# Patient Record
Sex: FEMALE | Race: WHITE | Employment: FULL TIME | URBAN - METROPOLITAN AREA
[De-identification: names, ages, dates, MRNs, and addresses within clinical notes are randomized per-mention and may not be internally consistent; named-entity substitution may affect disease eponyms.]

---

## 2017-01-16 ENCOUNTER — OFFICE VISIT (OUTPATIENT)
Dept: FAMILY MEDICINE CLINIC | Age: 37
End: 2017-01-16

## 2017-01-16 VITALS
SYSTOLIC BLOOD PRESSURE: 112 MMHG | DIASTOLIC BLOOD PRESSURE: 68 MMHG | HEIGHT: 72 IN | BODY MASS INDEX: 35.03 KG/M2 | WEIGHT: 258.6 LBS

## 2017-01-16 DIAGNOSIS — E03.4 HYPOTHYROIDISM DUE TO ACQUIRED ATROPHY OF THYROID: ICD-10-CM

## 2017-01-16 DIAGNOSIS — R25.1 TREMOR: Primary | ICD-10-CM

## 2017-01-16 DIAGNOSIS — F32.9 REACTIVE DEPRESSION: ICD-10-CM

## 2017-01-16 PROCEDURE — 99213 OFFICE O/P EST LOW 20 MIN: CPT | Performed by: FAMILY MEDICINE

## 2017-01-16 RX ORDER — PAROXETINE 10 MG/1
10 TABLET, FILM COATED ORAL 3 TIMES DAILY
COMMUNITY
End: 2017-03-07 | Stop reason: DRUGHIGH

## 2017-01-16 ASSESSMENT — ENCOUNTER SYMPTOMS
RESPIRATORY NEGATIVE: 1
GASTROINTESTINAL NEGATIVE: 1

## 2017-02-07 RX ORDER — PAROXETINE 30 MG/1
30 TABLET, FILM COATED ORAL EVERY MORNING
Qty: 30 TABLET | Refills: 3 | Status: SHIPPED | OUTPATIENT
Start: 2017-02-07 | End: 2017-10-16 | Stop reason: SDUPTHER

## 2017-03-07 ENCOUNTER — OFFICE VISIT (OUTPATIENT)
Dept: FAMILY MEDICINE CLINIC | Age: 37
End: 2017-03-07

## 2017-03-07 VITALS
BODY MASS INDEX: 35.08 KG/M2 | HEIGHT: 72 IN | WEIGHT: 259 LBS | SYSTOLIC BLOOD PRESSURE: 112 MMHG | DIASTOLIC BLOOD PRESSURE: 62 MMHG

## 2017-03-07 DIAGNOSIS — E03.4 HYPOTHYROIDISM DUE TO ACQUIRED ATROPHY OF THYROID: Primary | ICD-10-CM

## 2017-03-07 LAB — TSH SERPL DL<=0.05 MIU/L-ACNC: 4.64 UIU/ML (ref 0.27–4.2)

## 2017-03-07 PROCEDURE — 36415 COLL VENOUS BLD VENIPUNCTURE: CPT | Performed by: FAMILY MEDICINE

## 2017-03-07 PROCEDURE — 99213 OFFICE O/P EST LOW 20 MIN: CPT | Performed by: FAMILY MEDICINE

## 2017-03-07 ASSESSMENT — ENCOUNTER SYMPTOMS
GASTROINTESTINAL NEGATIVE: 1
RESPIRATORY NEGATIVE: 1

## 2017-03-08 RX ORDER — LEVOTHYROXINE SODIUM 175 UG/1
175 TABLET ORAL DAILY
Qty: 30 TABLET | Refills: 3 | Status: SHIPPED | OUTPATIENT
Start: 2017-03-08 | End: 2017-07-06 | Stop reason: SDUPTHER

## 2017-07-07 RX ORDER — LEVOTHYROXINE SODIUM 175 UG/1
TABLET ORAL
Qty: 90 TABLET | Refills: 0 | Status: SHIPPED | OUTPATIENT
Start: 2017-07-07 | End: 2018-01-03 | Stop reason: DRUGHIGH

## 2017-07-07 RX ORDER — SPIRONOLACTONE 50 MG/1
TABLET, FILM COATED ORAL
Qty: 90 TABLET | Refills: 0 | Status: SHIPPED | OUTPATIENT
Start: 2017-07-07 | End: 2017-12-28 | Stop reason: SDUPTHER

## 2017-10-16 RX ORDER — PAROXETINE 30 MG/1
TABLET, FILM COATED ORAL
Qty: 30 TABLET | Refills: 3 | Status: SHIPPED | OUTPATIENT
Start: 2017-10-16 | End: 2018-04-12 | Stop reason: SDUPTHER

## 2017-12-29 RX ORDER — SPIRONOLACTONE 50 MG/1
TABLET, FILM COATED ORAL
Qty: 90 TABLET | Refills: 0 | Status: SHIPPED | OUTPATIENT
Start: 2017-12-29 | End: 2018-04-12 | Stop reason: SDUPTHER

## 2018-01-02 ENCOUNTER — TELEPHONE (OUTPATIENT)
Dept: FAMILY MEDICINE CLINIC | Age: 38
End: 2018-01-02

## 2018-01-02 ENCOUNTER — OFFICE VISIT (OUTPATIENT)
Dept: FAMILY MEDICINE CLINIC | Age: 38
End: 2018-01-02

## 2018-01-02 VITALS
HEIGHT: 72 IN | SYSTOLIC BLOOD PRESSURE: 102 MMHG | TEMPERATURE: 97.5 F | BODY MASS INDEX: 34 KG/M2 | WEIGHT: 251 LBS | DIASTOLIC BLOOD PRESSURE: 68 MMHG

## 2018-01-02 DIAGNOSIS — H66.93 OM (OTITIS MEDIA), RECURRENT, BILATERAL: Primary | ICD-10-CM

## 2018-01-02 DIAGNOSIS — E03.4 HYPOTHYROIDISM DUE TO ACQUIRED ATROPHY OF THYROID: ICD-10-CM

## 2018-01-02 DIAGNOSIS — F32.9 REACTIVE DEPRESSION: ICD-10-CM

## 2018-01-02 LAB — TSH SERPL DL<=0.05 MIU/L-ACNC: 5.48 UIU/ML (ref 0.27–4.2)

## 2018-01-02 PROCEDURE — 99214 OFFICE O/P EST MOD 30 MIN: CPT | Performed by: FAMILY MEDICINE

## 2018-01-02 PROCEDURE — 36415 COLL VENOUS BLD VENIPUNCTURE: CPT | Performed by: FAMILY MEDICINE

## 2018-01-02 RX ORDER — CEPHALEXIN 500 MG/1
500 CAPSULE ORAL 3 TIMES DAILY
Qty: 30 CAPSULE | Refills: 0 | Status: SHIPPED | OUTPATIENT
Start: 2018-01-02 | End: 2018-02-02

## 2018-01-02 ASSESSMENT — ENCOUNTER SYMPTOMS
RHINORRHEA: 1
DIARRHEA: 0
ABDOMINAL PAIN: 0
COUGH: 0
VOMITING: 0
SORE THROAT: 1

## 2018-01-02 NOTE — PROGRESS NOTES
Constitutional: Positive for appetite change, fatigue and unexpected weight change. HENT: Positive for ear pain, hearing loss, rhinorrhea and sore throat. Negative for ear discharge. Respiratory: Negative for cough. Gastrointestinal: Negative for abdominal pain, diarrhea and vomiting. Musculoskeletal: Negative for neck pain. Skin: Negative for rash. Neurological: Positive for headaches. Psychiatric/Behavioral: Positive for agitation. Negative for dysphoric mood and hallucinations. YOB: 1980    Date of Visit:  1/2/2018    No Known Allergies    Outpatient Prescriptions Marked as Taking for the 1/2/18 encounter (Office Visit) with Neil Reynolds,    Medication Sig Dispense Refill    BuPROPion HCl (WELLBUTRIN PO) Take by mouth      spironolactone (ALDACTONE) 50 MG tablet TAKE ONE TABLET BY MOUTH DAILY 90 tablet 0    PARoxetine (PAXIL) 30 MG tablet TAKE ONE TABLET BY MOUTH IN THE MORNING 30 tablet 3    levothyroxine (SYNTHROID) 175 MCG tablet TAKE ONE TABLET BY MOUTH ONCE DAILY 90 tablet 0    vitamin D (CHOLECALCIFEROL) 1000 UNIT TABS tablet Take 1 tablet by mouth daily 90 tablet 3    levothyroxine (LEVOTHROID) 150 MCG tablet Take 1 tablet by mouth daily 30 tablet 3    ATRALIN 0.05 % gel Apply a pea sized amount to face QHS 45 g 4    fluticasone (FLONASE) 50 MCG/ACT nasal spray PLACE ONE SPRAY TWICE A DAY IN EACH NOSTRIL 16 g 2       Vitals:    01/02/18 1434   BP: 102/68   Temp: 97.5 °F (36.4 °C)   Weight: 251 lb (113.9 kg)   Height: 6' (1.829 m)     Body mass index is 34.04 kg/m². Wt Readings from Last 3 Encounters:   01/02/18 251 lb (113.9 kg)   03/07/17 259 lb (117.5 kg)   01/16/17 258 lb 9.6 oz (117.3 kg)     BP Readings from Last 3 Encounters:   01/02/18 102/68   03/07/17 112/62   01/16/17 112/68       Objective:   Physical Exam   Constitutional: She is oriented to person, place, and time. She appears well-developed and well-nourished. No distress.    HENT:   Head: Normocephalic. Right Ear: Tympanic membrane, external ear and ear canal normal.   Left Ear: Tympanic membrane, external ear and ear canal normal.   Nose: Mucosal edema present. Mouth/Throat: Posterior oropharyngeal erythema present. bilat tms red and bulging right > left   Eyes: Conjunctivae are normal. Left eye discharge: pnd    Neck: No thyromegaly present. Cardiovascular: Normal rate. Pulmonary/Chest: Effort normal and breath sounds normal. No respiratory distress. She has no wheezes. She has no rales. Lymphadenopathy:     She has cervical adenopathy. Neurological: She is alert and oriented to person, place, and time. Skin: Skin is warm and dry. No rash noted. Psychiatric: She has a normal mood and affect. Her behavior is normal. Judgment and thought content normal.       Assessment:      Assessment/plan;  Lynette Haque was seen today for congestion, ear fullness, cough and nasal congestion. Diagnoses and all orders for this visit:    OM (otitis media), recurrent, bilateral  -     cephALEXin (KEFLEX) 500 MG capsule; Take 1 capsule by mouth 3 times daily    Hypothyroidism due to acquired atrophy of thyroid  -     TSH without Reflex  Adjust meds as needed    Reactive depression  Saw psychiatrist through her gyn  She added wellbutrin to the paxil and it is helping      No Follow-up on file.

## 2018-01-03 RX ORDER — LEVOTHYROXINE SODIUM 0.2 MG/1
200 TABLET ORAL DAILY
Qty: 30 TABLET | Refills: 3 | Status: SHIPPED | OUTPATIENT
Start: 2018-01-03 | End: 2018-08-25 | Stop reason: SDUPTHER

## 2018-01-09 RX ORDER — AZITHROMYCIN 250 MG/1
TABLET, FILM COATED ORAL
Qty: 1 PACKET | Refills: 0 | Status: SHIPPED | OUTPATIENT
Start: 2018-01-09 | End: 2018-01-19

## 2018-01-12 ENCOUNTER — OFFICE VISIT (OUTPATIENT)
Dept: FAMILY MEDICINE CLINIC | Age: 38
End: 2018-01-12

## 2018-01-12 VITALS
BODY MASS INDEX: 33.86 KG/M2 | WEIGHT: 250 LBS | TEMPERATURE: 98.4 F | DIASTOLIC BLOOD PRESSURE: 70 MMHG | HEART RATE: 61 BPM | SYSTOLIC BLOOD PRESSURE: 104 MMHG | HEIGHT: 72 IN

## 2018-01-12 DIAGNOSIS — H65.03 BILATERAL ACUTE SEROUS OTITIS MEDIA, RECURRENCE NOT SPECIFIED: Primary | ICD-10-CM

## 2018-01-12 DIAGNOSIS — H69.83 DYSFUNCTION OF BOTH EUSTACHIAN TUBES: ICD-10-CM

## 2018-01-12 PROCEDURE — 99213 OFFICE O/P EST LOW 20 MIN: CPT | Performed by: FAMILY MEDICINE

## 2018-01-12 RX ORDER — METHYLPREDNISOLONE 4 MG/1
TABLET ORAL
Qty: 1 KIT | Refills: 0 | Status: SHIPPED | OUTPATIENT
Start: 2018-01-12 | End: 2018-01-18

## 2018-01-12 RX ORDER — DOXYCYCLINE HYCLATE 100 MG
100 TABLET ORAL 2 TIMES DAILY
Qty: 20 TABLET | Refills: 0 | Status: SHIPPED | OUTPATIENT
Start: 2018-01-12 | End: 2019-01-07 | Stop reason: SDUPTHER

## 2018-01-12 ASSESSMENT — PATIENT HEALTH QUESTIONNAIRE - PHQ9
2. FEELING DOWN, DEPRESSED OR HOPELESS: 0
SUM OF ALL RESPONSES TO PHQ QUESTIONS 1-9: 0
1. LITTLE INTEREST OR PLEASURE IN DOING THINGS: 0
SUM OF ALL RESPONSES TO PHQ9 QUESTIONS 1 & 2: 0

## 2018-01-12 ASSESSMENT — ENCOUNTER SYMPTOMS
ABDOMINAL PAIN: 0
VOMITING: 0
COUGH: 0
RHINORRHEA: 1
DIARRHEA: 0
SORE THROAT: 1

## 2018-01-13 NOTE — PROGRESS NOTES
Subjective:      Patient ID: Alek Farfan is a 40 y.o. female. Otalgia    There is pain in both ears. This is a recurrent problem. The current episode started more than 1 month ago. The problem occurs every few minutes. The problem has been waxing and waning. There has been no fever. Associated symptoms include ear discharge, headaches, hearing loss, rhinorrhea and a sore throat. Pertinent negatives include no abdominal pain, coughing, diarrhea, neck pain, rash or vomiting. She has tried acetaminophen for the symptoms. The treatment provided mild relief. Review of Systems   HENT: Positive for ear discharge, ear pain, hearing loss, rhinorrhea and sore throat. Respiratory: Negative for cough. Gastrointestinal: Negative for abdominal pain, diarrhea and vomiting. Musculoskeletal: Negative for neck pain. Skin: Negative for rash. Neurological: Positive for headaches. YOB: 1980    Date of Visit:  1/12/2018    No Known Allergies    Outpatient Prescriptions Marked as Taking for the 1/12/18 encounter (Office Visit) with Allyne Cramp, DO   Medication Sig Dispense Refill    methylPREDNISolone (MEDROL DOSEPACK) 4 MG tablet Take by mouth.  1 kit 0    doxycycline hyclate (VIBRA-TABS) 100 MG tablet Take 1 tablet by mouth 2 times daily for 10 days 20 tablet 0    azithromycin (ZITHROMAX) 250 MG tablet As directed 1 packet 0    levothyroxine (SYNTHROID) 200 MCG tablet Take 1 tablet by mouth Daily 30 tablet 3    BuPROPion HCl (WELLBUTRIN PO) Take by mouth      cephALEXin (KEFLEX) 500 MG capsule Take 1 capsule by mouth 3 times daily 30 capsule 0    spironolactone (ALDACTONE) 50 MG tablet TAKE ONE TABLET BY MOUTH DAILY 90 tablet 0    PARoxetine (PAXIL) 30 MG tablet TAKE ONE TABLET BY MOUTH IN THE MORNING 30 tablet 3    vitamin D (CHOLECALCIFEROL) 1000 UNIT TABS tablet Take 1 tablet by mouth daily 90 tablet 3    levothyroxine (LEVOTHROID) 150 MCG tablet Take 1 tablet by mouth daily 30 tablet 3    ATRALIN 0.05 % gel Apply a pea sized amount to face QHS 45 g 4    fluticasone (FLONASE) 50 MCG/ACT nasal spray PLACE ONE SPRAY TWICE A DAY IN EACH NOSTRIL 16 g 2       Vitals:    01/12/18 1439   BP: 104/70   Pulse: 61   Temp: 98.4 °F (36.9 °C)   Weight: 250 lb (113.4 kg)   Height: 6' (1.829 m)     Body mass index is 33.91 kg/m². Wt Readings from Last 3 Encounters:   01/12/18 250 lb (113.4 kg)   01/02/18 251 lb (113.9 kg)   03/07/17 259 lb (117.5 kg)     BP Readings from Last 3 Encounters:   01/12/18 104/70   01/02/18 102/68   03/07/17 112/62         Objective:   Physical Exam   Constitutional: She is oriented to person, place, and time. She appears well-developed and well-nourished. No distress. HENT:   Head: Normocephalic. Right Ear: Tympanic membrane, external ear and ear canal normal.   Left Ear: Tympanic membrane, external ear and ear canal normal.   Nose: Mucosal edema present. Mouth/Throat: Posterior oropharyngeal erythema present. Eyes: Conjunctivae are normal. Left eye discharge: pnd    Neck: No thyromegaly present. Cardiovascular: Normal rate, regular rhythm and normal heart sounds. Pulmonary/Chest: Effort normal and breath sounds normal. No respiratory distress. She has no wheezes. She has no rales. Lymphadenopathy:     She has no cervical adenopathy. Neurological: She is alert and oriented to person, place, and time. Skin: Skin is warm and dry. No rash noted. Psychiatric: She has a normal mood and affect. Her behavior is normal. Judgment and thought content normal.   Nursing note and vitals reviewed. Assessment:            Plan:      Assessment/plan;  Nahid Alvarenga was seen today for otitis media. Diagnoses and all orders for this visit:    Bilateral acute serous otitis media, recurrence not specified    Dysfunction of both eustachian tubes    Other orders  -     methylPREDNISolone (MEDROL DOSEPACK) 4 MG tablet;  Take by mouth.  -     doxycycline hyclate

## 2018-02-02 ENCOUNTER — OFFICE VISIT (OUTPATIENT)
Dept: ENT CLINIC | Age: 38
End: 2018-02-02

## 2018-02-02 VITALS
HEART RATE: 60 BPM | DIASTOLIC BLOOD PRESSURE: 67 MMHG | SYSTOLIC BLOOD PRESSURE: 109 MMHG | BODY MASS INDEX: 33.72 KG/M2 | WEIGHT: 249 LBS | HEIGHT: 72 IN

## 2018-02-02 DIAGNOSIS — H65.21 CHRONIC SEROUS OTITIS MEDIA, RIGHT EAR: ICD-10-CM

## 2018-02-02 DIAGNOSIS — H69.83 ETD (EUSTACHIAN TUBE DYSFUNCTION), BILATERAL: Primary | ICD-10-CM

## 2018-02-02 DIAGNOSIS — H91.93 BILATERAL HEARING LOSS, UNSPECIFIED HEARING LOSS TYPE: ICD-10-CM

## 2018-02-02 PROCEDURE — 99214 OFFICE O/P EST MOD 30 MIN: CPT | Performed by: OTOLARYNGOLOGY

## 2018-02-02 NOTE — PROGRESS NOTES
[address] [place] [address] ENT       PCP:  Tate Patterson DO      CHIEF COMPLAINT:  Chief Complaint   Patient presents with    Ear Problem       HISTORY OF PRESENT ILLNESS:       (Location, Quality, Severity, Duration, Timing, Context, Modifying factors, Associated symptoms)  Wendi Gregory is a 40 y.o. female for ENT consultation and evaluation of a problem located in the ears. She stated that \"in the middle of December 2017, and until two weeks ago, I had a sinus infection. It started with a little sore throat and PND. I assumed it was a sinus infection. \"  She called a \"Tele-doc line\" and was prescribed Augmentin which was called to her pharmacy. \"Then around Christmas, it got worse. My ears started to feel fullness and pain, in mostly the right ear. My left ear started on New Year's Cintia. Ear pain started New Year's Cintia mostly. \"  She went to see her PCP on January 3. \"She looked in both of my ears and said I had a bilateral ear infection. \"  She was treated with Keflex, which did not resolve the infection. Then, she was treated with a Z-wilfredo, and it still did not resolve. Then she was treated with prednisone and a third antibiotic. Around January 10, her hearing was decreased. She had no pain. Her ears started draining on News Year's Cintia, with a yellow brown drainage on her pillow in the morning . There was some drainage out of the ear with a Q-tip. She finished the antibiotic and prednisone. Still took quite a while for hearing to come back to normal, which occurred a few days after the antibiotics were finished. The ear pressure and pain are gone. This is the second year in a row that she had an ear infection that lasted a month. It occurred at the same time of the year. REVIEW OF SYSTEMS:    CONSTITUTIONAL:  Denied fever and chills. Denied unexplained weight loss. EARS, NOSE, THROAT:  (+) PND.   Denied otorrhea, otalgia, hearing loss, tinnitus, epistaxis, nasal dyspnea, rhinorrhea, sore throat and chronic hoarseness. PAST MEDICAL HISTORY:    Past Medical History:   Diagnosis Date    Anxiety     Depression     Hypothyroidism        History reviewed. No pertinent surgical history. EXAMINATION:    VITALS SIGNS reviewed. Vitals:    02/02/18 1519   BP: 109/67   Site: Left Arm   Position: Sitting   Cuff Size: Medium Adult   Pulse: 60   Weight: 249 lb (112.9 kg)   Height: 6' (1.829 m)     GENERAL. APPEARANCE:  Well developed, well nourished, no apparent distress, no apparent deformities. ABILITY TO COMMUNICATE/QUALITY OF VOICE:  Communicated without difficulty. Normal voice. INSPECTION, HEAD AND FACE:  Normal overall appearance, with no scars, lesions or masses. SINUSES:  The maxillary and frontal sinuses were nontender, bilaterally. SALIVARY GLANDS:  Parotid, submandibular and sublingual glands were normal.  FACIAL STRENGTH, MOTION:  Normal and equal for all five branches bilaterally. EYES:  Extraocular motion:  intact, normal primary gaze alignment.  (+) HEARING ASSESSMENT:  Tuning fork tests, [address] tuning fork:  Cano lateralized to the right ear. Finger rub:  Able to hear finger rub bilaterally. Rinne air > bone bilaterally. (+) EARS, OTOMICROSCOPY:  The right tympanic membrane was nonerythematous, dull, and thickened, with sclerosis inferior to the umbo from about 4 o'clock to 7 o'clock, and anterior to that was a shallow retraction pocket. The posterior half of the right tympanic membrane appeared to be relatively normal.  The right tympanic membrane was retracted with negative middle ear pressure on pneumatic massage. There was no response to auto inflation of the right tympanic membrane. The left tympanic membrane was nonerythematous, dull, and thickened, with a mild retraction pocket anterior to the malleus. The left tympanic membrane was mobile to pneumatic massage. EXTERNAL EAR/NOSE:  Normal pinnae and mastoids.  Normal external nose.  NOSE:  The nasal septum was midline. The inferior turbinates were normal.  The nasal mucosa and secretions were normal.  No pus or polyps were seen. LIPS, TEETH AND GUMS:  Normal.  OROPHARYNX/ORAL CAVITY:  Oral mucosa, hard and soft palates, tongue, tonsils (2+, cryptic, bilaterally), and pharynx were normal.  NECK:  No masses or tenderness. No abnormal appearance, asymmetry or tracheal deviation. Laryngeal cartilages and hyoid bone were normal to palpation. THYROID:  No nodules, enlargement, tenderness or masses. LYMPH NODES, CERVICAL, FACIAL AND SUPRACLAVICULAR:  No lymphadenopathy. Triston Gomez / Jannie Page / Emma Estrada:       Jeremy King was seen today for ear problem. Diagnoses and all orders for this visit:    ETD (Eustachian tube dysfunction), bilateral  -     Internal Referral to Audiology at [facility]    Chronic serous otitis media, right ear  -     Internal Referral to Audiology at [facility]    Bilateral hearing loss, unspecified hearing loss type  -     Internal Referral to Audiology at [facility]        RECOMMENDATIONS/PLAN:    1. See Patient Instructions. 2. Return in about 1 month (around 3/2/2018) for recheck/follow-up, and sooner if condition worsens.

## 2018-02-02 NOTE — PATIENT INSTRUCTIONS
HOME INSTRUCTIONS - EUSTACHIAN TUBE DYSFUNCTION    Please do the following to attempt to improve your Eustachian tube dysfunction  and/or to help to resolve the fluid in your middle ear:  1. Auto inflate your ears as instructed every three hours, while awake, for ten days, and then four times daily. 2.  Take one Mucinex-D (red orange box) maximum strength tablet (ONLY IF approved by your PCP) each morning and one Mucinex (blue box) maximum strength tablet each evening, about 12 hours later, daily for the next four weeks. (OTC)      3.  Use 0.05% Oxymetazoline (eg. Afrin, Duration, 4-Way) 12 hour decongestant nasal solution, with two sprays in each nostril three times a day for three days, then twice a day for two days, then stop for two days and then repeat the cycle once. 4.  Use fluticasone nasal spray (generic Flonase), 2 sprays in each nostril once daily, for 14 days and then as needed for ear congestion, Eustachian tube dysfunction.

## 2018-02-04 RX ORDER — LEVOTHYROXINE SODIUM 175 UG/1
TABLET ORAL
Qty: 90 TABLET | Refills: 0 | Status: SHIPPED | OUTPATIENT
Start: 2018-02-04 | End: 2018-03-07

## 2018-02-08 ENCOUNTER — OFFICE VISIT (OUTPATIENT)
Dept: ENT CLINIC | Age: 38
End: 2018-02-08

## 2018-02-08 DIAGNOSIS — H90.11 CONDCTV HEAR LOSS, UNI, RIGHT EAR, W UNRESTR HEAR CNTRA SIDE: Primary | ICD-10-CM

## 2018-02-08 PROCEDURE — 92550 TYMPANOMETRY & REFLEX THRESH: CPT | Performed by: AUDIOLOGIST

## 2018-02-08 PROCEDURE — 92557 COMPREHENSIVE HEARING TEST: CPT | Performed by: AUDIOLOGIST

## 2018-03-07 ENCOUNTER — OFFICE VISIT (OUTPATIENT)
Dept: ENT CLINIC | Age: 38
End: 2018-03-07

## 2018-03-07 VITALS — DIASTOLIC BLOOD PRESSURE: 60 MMHG | SYSTOLIC BLOOD PRESSURE: 93 MMHG | HEART RATE: 53 BPM

## 2018-03-07 DIAGNOSIS — H69.81 ETD (EUSTACHIAN TUBE DYSFUNCTION), RIGHT: ICD-10-CM

## 2018-03-07 DIAGNOSIS — H90.11 CONDCTV HEAR LOSS, UNI, RIGHT EAR, W UNRESTR HEAR CNTRA SIDE: Primary | ICD-10-CM

## 2018-03-07 PROCEDURE — 99214 OFFICE O/P EST MOD 30 MIN: CPT | Performed by: OTOLARYNGOLOGY

## 2018-03-07 RX ORDER — GUAIFENESIN, PSEUDOEPHEDRINE HYDROCHLORIDE 600; 60 MG/1; MG/1
1 TABLET, EXTENDED RELEASE ORAL DAILY
COMMUNITY
End: 2021-06-23

## 2018-03-17 NOTE — PROGRESS NOTES
uni, right ear, w unrestr hear cntra side    ETD (Eustachian tube dysfunction), right           RECOMMENDATIONS / PLAN:      1. Auto inflation maneuver twice daily and as needed for middle ear congestion or hearing loss or pressure. 2. I recommended an annual hearing test to monitor hearing loss, for any further decrease in hearing. 3. I recommended no use of Q-tips or other instruments for ear cleaning. I recommended using only the over-the-counter earwax removal systems for this purpose, due to the risk of injury to the tympanic membrane and/or ossicular chain. 4. Return for any further Ear, Nose, Throat, or Sinus problems. TIME:  I spent a total of 25 minutes with this patient; counseling time = 20 minutes. More than 50% of the visit was spent counseling and/or coordination of care.

## 2018-03-21 PROBLEM — H69.81 ETD (EUSTACHIAN TUBE DYSFUNCTION), RIGHT: Status: ACTIVE | Noted: 2018-03-21

## 2018-03-21 PROBLEM — H69.91 ETD (EUSTACHIAN TUBE DYSFUNCTION), RIGHT: Status: ACTIVE | Noted: 2018-03-21

## 2018-03-21 PROBLEM — H90.11 CONDCTV HEAR LOSS, UNI, RIGHT EAR, W UNRESTR HEAR CNTRA SIDE: Status: ACTIVE | Noted: 2018-03-21

## 2018-04-13 RX ORDER — PAROXETINE 30 MG/1
TABLET, FILM COATED ORAL
Qty: 30 TABLET | Refills: 3 | Status: SHIPPED | OUTPATIENT
Start: 2018-04-13 | End: 2018-12-01 | Stop reason: SDUPTHER

## 2018-04-13 RX ORDER — SPIRONOLACTONE 50 MG/1
TABLET, FILM COATED ORAL
Qty: 90 TABLET | Refills: 0 | Status: SHIPPED | OUTPATIENT
Start: 2018-04-13 | End: 2018-11-09 | Stop reason: SDUPTHER

## 2018-04-16 ENCOUNTER — NURSE ONLY (OUTPATIENT)
Dept: FAMILY MEDICINE CLINIC | Age: 38
End: 2018-04-16

## 2018-04-16 DIAGNOSIS — E03.4 HYPOTHYROIDISM DUE TO ACQUIRED ATROPHY OF THYROID: Primary | ICD-10-CM

## 2018-04-16 PROCEDURE — 36415 COLL VENOUS BLD VENIPUNCTURE: CPT | Performed by: FAMILY MEDICINE

## 2018-04-17 LAB — TSH SERPL DL<=0.05 MIU/L-ACNC: 1.88 UIU/ML (ref 0.27–4.2)

## 2018-08-22 ENCOUNTER — TELEPHONE (OUTPATIENT)
Dept: FAMILY MEDICINE CLINIC | Age: 38
End: 2018-08-22

## 2018-11-09 RX ORDER — SPIRONOLACTONE 50 MG/1
TABLET, FILM COATED ORAL
Qty: 90 TABLET | Refills: 0 | Status: SHIPPED | OUTPATIENT
Start: 2018-11-09 | End: 2019-02-07 | Stop reason: SDUPTHER

## 2018-12-02 RX ORDER — PAROXETINE 30 MG/1
TABLET, FILM COATED ORAL
Qty: 90 TABLET | Refills: 0 | Status: SHIPPED | OUTPATIENT
Start: 2018-12-02 | End: 2019-03-01 | Stop reason: SDUPTHER

## 2018-12-02 RX ORDER — LEVOTHYROXINE SODIUM 200 MCG
TABLET ORAL
Qty: 60 TABLET | Refills: 0 | Status: SHIPPED | OUTPATIENT
Start: 2018-12-02 | End: 2018-12-31 | Stop reason: SDUPTHER

## 2018-12-31 RX ORDER — LEVOTHYROXINE SODIUM 0.2 MG/1
TABLET ORAL
Qty: 90 TABLET | Refills: 0 | Status: SHIPPED | OUTPATIENT
Start: 2018-12-31 | End: 2019-05-08 | Stop reason: SDUPTHER

## 2019-01-07 ENCOUNTER — OFFICE VISIT (OUTPATIENT)
Dept: FAMILY MEDICINE CLINIC | Age: 39
End: 2019-01-07
Payer: COMMERCIAL

## 2019-01-07 VITALS
DIASTOLIC BLOOD PRESSURE: 70 MMHG | BODY MASS INDEX: 31.56 KG/M2 | HEIGHT: 72 IN | SYSTOLIC BLOOD PRESSURE: 122 MMHG | TEMPERATURE: 99 F | WEIGHT: 233 LBS

## 2019-01-07 DIAGNOSIS — B96.89 ACUTE BACTERIAL SINUSITIS: Primary | ICD-10-CM

## 2019-01-07 DIAGNOSIS — H66.91 OM (OTITIS MEDIA), RECURRENT, RIGHT: ICD-10-CM

## 2019-01-07 DIAGNOSIS — J01.90 ACUTE BACTERIAL SINUSITIS: Primary | ICD-10-CM

## 2019-01-07 PROCEDURE — 99213 OFFICE O/P EST LOW 20 MIN: CPT | Performed by: FAMILY MEDICINE

## 2019-01-07 RX ORDER — DOXYCYCLINE HYCLATE 100 MG
100 TABLET ORAL 2 TIMES DAILY
Qty: 20 TABLET | Refills: 0 | Status: SHIPPED | OUTPATIENT
Start: 2019-01-07 | End: 2019-01-17

## 2019-01-07 RX ORDER — GUAIFENESIN 400 MG/1
400 TABLET ORAL 4 TIMES DAILY PRN
COMMUNITY
End: 2022-10-31

## 2019-01-07 ASSESSMENT — ENCOUNTER SYMPTOMS
COUGH: 1
SINUS PAIN: 1
RHINORRHEA: 1

## 2019-01-07 ASSESSMENT — PATIENT HEALTH QUESTIONNAIRE - PHQ9
SUM OF ALL RESPONSES TO PHQ9 QUESTIONS 1 & 2: 0
2. FEELING DOWN, DEPRESSED OR HOPELESS: 0
SUM OF ALL RESPONSES TO PHQ QUESTIONS 1-9: 0
1. LITTLE INTEREST OR PLEASURE IN DOING THINGS: 0
SUM OF ALL RESPONSES TO PHQ QUESTIONS 1-9: 0

## 2019-03-01 RX ORDER — PAROXETINE 30 MG/1
TABLET, FILM COATED ORAL
Qty: 90 TABLET | Refills: 0 | Status: SHIPPED | OUTPATIENT
Start: 2019-03-01 | End: 2022-02-08 | Stop reason: SDUPTHER

## 2019-05-24 ENCOUNTER — OFFICE VISIT (OUTPATIENT)
Dept: FAMILY MEDICINE CLINIC | Age: 39
End: 2019-05-24
Payer: COMMERCIAL

## 2019-05-24 VITALS
SYSTOLIC BLOOD PRESSURE: 106 MMHG | TEMPERATURE: 98.2 F | HEIGHT: 72 IN | WEIGHT: 236 LBS | BODY MASS INDEX: 31.97 KG/M2 | DIASTOLIC BLOOD PRESSURE: 70 MMHG
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
## 2019-05-24 PROCEDURE — 99213 OFFICE O/P EST LOW 20 MIN: CPT | Performed by: FAMILY MEDICINE

## 2019-05-24 RX ORDER — PROPRANOLOL HYDROCHLORIDE 20 MG/1
TABLET ORAL
Refills: 1 | COMMUNITY
Start: 2019-04-02 | End: 2021-01-27

## 2019-05-24 RX ORDER — AMOXICILLIN AND CLAVULANATE POTASSIUM 875; 125 MG/1; MG/1
1 TABLET, FILM COATED ORAL 2 TIMES DAILY
Qty: 14 TABLET | Refills: 0 | Status: SHIPPED | OUTPATIENT
Start: 2019-05-24 | End: 2019-05-31

## 2019-05-24 RX ORDER — METHYLPHENIDATE HYDROCHLORIDE 5 MG/1
TABLET ORAL
Refills: 0 | COMMUNITY
Start: 2019-05-15 | End: 2022-02-08 | Stop reason: ALTCHOICE

## 2019-05-26 ASSESSMENT — ENCOUNTER SYMPTOMS
RESPIRATORY NEGATIVE: 1
FACIAL SWELLING: 1

## 2019-05-27 NOTE — PROGRESS NOTES
OUTPATIENT PROGRESS NOTE  Date of Service:  5/24/2019  Address: [address] [place] [address]  [address]  [address] [facility] [place] [address]  Dept: [phone]  Loc: [phone]    Subjective:      Patient ID:  [id number]  Funmilayo Dominguez is a 45 y.o. female     Animal Bite    The incident occurred yesterday. The incident occurred at home. She came to the ER via personal transport. There is an injury to the face. The pain is mild. It is unlikely that a foreign body is present. Pertinent negatives include no visual disturbance and no headaches. Her tetanus status is UTD. bite to right cheek  She cleaned out the area and is putting antibiotic ointment   Seems to be doing ok but has some pain under her chin and upper neck   No fever or chills      Review of Systems   Constitutional: Positive for activity change. HENT: Positive for facial swelling. Eyes: Negative for visual disturbance. Respiratory: Negative. Cardiovascular: Negative. Skin: Positive for wound. Neurological: Negative for headaches.        Objective:   YOB: 1980    Date of Visit:  5/24/2019     No Known Allergies    Outpatient Medications Marked as Taking for the 5/24/19 encounter (Office Visit) with Pasquale Mejia, DO   Medication Sig Dispense Refill    methylphenidate (RITALIN) 5 MG tablet TAKE 1 TABLET BY MOUTH TWO TIMES A DAY  0    amoxicillin-clavulanate (AUGMENTIN) 875-125 MG per tablet Take 1 tablet by mouth 2 times daily for 7 days 14 tablet 0    levothyroxine (SYNTHROID) 200 MCG tablet TAKE 1 TABLET DAILY 90 tablet 0    PARoxetine (PAXIL) 30 MG tablet TAKE 1 TABLET IN THE       MORNING 90 tablet 0    spironolactone (ALDACTONE) 50 MG tablet TAKE 1 TABLET ONCE DAILY 90 tablet 1    BuPROPion HCl (WELLBUTRIN PO) Take by mouth      ATRALIN 0.05 % gel Apply a pea sized amount to face QHS 45 g 4       Vitals:    05/24/19 1456   BP: 106/70   Temp: 98.2 °F (36.8 °C) Weight: 236 lb (107 kg)   Height: 6' (1.829 m)     Body mass index is 32.01 kg/m². Wt Readings from Last 3 Encounters:   05/24/19 236 lb (107 kg)   01/07/19 233 lb (105.7 kg)   02/02/18 249 lb (112.9 kg)     BP Readings from Last 3 Encounters:   05/24/19 106/70   01/07/19 122/70   03/07/18 93/60       Physical Exam   Constitutional: She appears well-developed and well-nourished. No distress. HENT:   Head: Normocephalic. Left cheek with two wounds from bite  Some erythema surrounding   No problem with jaw movement and eye movement    Eyes: Pupils are equal, round, and reactive to light. Conjunctivae are normal.   Neck: No thyromegaly present. Lymphadenopathy:     She has no cervical adenopathy. Assessment/Plan       Susan Rosario was seen today for animal bite. Diagnoses and all orders for this visit:    Dog bite, initial encounter    Other orders  -     amoxicillin-clavulanate (AUGMENTIN) 875-125 MG per tablet;  Take 1 tablet by mouth 2 times daily for 7 days      Healing well  Watch for any sign of infection                  Little Bussing, DO

## 2019-08-06 RX ORDER — LEVOTHYROXINE SODIUM 200 MCG
TABLET ORAL
Qty: 90 TABLET | Refills: 0 | Status: SHIPPED | OUTPATIENT
Start: 2019-08-06 | End: 2021-02-01 | Stop reason: SDUPTHER

## 2019-11-04 RX ORDER — LEVOTHYROXINE SODIUM 200 MCG
TABLET ORAL
Qty: 90 TABLET | Refills: 0 | Status: SHIPPED | OUTPATIENT
Start: 2019-11-04 | End: 2020-01-22 | Stop reason: SDUPTHER

## 2020-01-22 RX ORDER — LEVOTHYROXINE SODIUM 0.2 MG/1
TABLET ORAL
Qty: 90 TABLET | Refills: 1 | Status: SHIPPED | OUTPATIENT
Start: 2020-01-22 | End: 2020-10-11

## 2020-03-05 ENCOUNTER — PATIENT MESSAGE (OUTPATIENT)
Dept: FAMILY MEDICINE CLINIC | Age: 40
End: 2020-03-05

## 2020-03-05 RX ORDER — OSELTAMIVIR PHOSPHATE 75 MG/1
75 CAPSULE ORAL DAILY
Qty: 10 CAPSULE | Refills: 0 | Status: SHIPPED | OUTPATIENT
Start: 2020-03-05 | End: 2020-03-15

## 2020-03-05 RX ORDER — OSELTAMIVIR PHOSPHATE 75 MG/1
75 CAPSULE ORAL DAILY
Qty: 10 CAPSULE | Refills: 0 | Status: SHIPPED | OUTPATIENT
Start: 2020-03-05 | End: 2020-03-05 | Stop reason: SDUPTHER
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
## 2020-03-13 ENCOUNTER — OFFICE VISIT (OUTPATIENT)
Dept: FAMILY MEDICINE CLINIC | Age: 40
End: 2020-03-13
Payer: COMMERCIAL

## 2020-03-13 VITALS
BODY MASS INDEX: 33.05 KG/M2 | SYSTOLIC BLOOD PRESSURE: 126 MMHG | HEIGHT: 72 IN | WEIGHT: 244 LBS | DIASTOLIC BLOOD PRESSURE: 60 MMHG

## 2020-03-13 PROCEDURE — 99214 OFFICE O/P EST MOD 30 MIN: CPT | Performed by: FAMILY MEDICINE

## 2020-03-13 RX ORDER — FAMCICLOVIR 500 MG/1
500 TABLET, FILM COATED ORAL 3 TIMES DAILY
Qty: 30 TABLET | Refills: 0 | Status: SHIPPED | OUTPATIENT
Start: 2020-03-13 | End: 2020-03-23

## 2020-03-13 RX ORDER — TRETINOIN 0.05 G/100G
GEL TOPICAL
Qty: 45 G | Refills: 0 | Status: SHIPPED | OUTPATIENT
Start: 2020-03-13 | End: 2021-01-27

## 2020-03-13 RX ORDER — MOMETASONE FUROATE 1 MG/G
CREAM TOPICAL
Qty: 45 G | Refills: 1 | Status: SHIPPED | OUTPATIENT
Start: 2020-03-13 | End: 2021-01-27

## 2020-03-13 RX ORDER — FLUCONAZOLE 150 MG/1
150 TABLET ORAL ONCE
Qty: 1 TABLET | Refills: 0 | Status: SHIPPED | OUTPATIENT
Start: 2020-03-13 | End: 2020-03-13

## 2020-03-13 ASSESSMENT — PATIENT HEALTH QUESTIONNAIRE - PHQ9
2. FEELING DOWN, DEPRESSED OR HOPELESS: 0
SUM OF ALL RESPONSES TO PHQ9 QUESTIONS 1 & 2: 0
1. LITTLE INTEREST OR PLEASURE IN DOING THINGS: 0
SUM OF ALL RESPONSES TO PHQ QUESTIONS 1-9: 0
SUM OF ALL RESPONSES TO PHQ QUESTIONS 1-9: 0

## 2020-03-13 NOTE — LETTER
[address] [facility] [address] [facility],First [address] [address]  Phone: [phone]  Fax: [phone]    Aidan Serrato DO        March 13, 2020     Patient: Jensen Luna   YOB: 1980   Date of Visit: 3/13/2020       To Whom it May Concern:    Jensen Luna was seen in my clinic on 3/13/2020. I recommend she work from home 3/16 and 3/17/20. If you have any questions or concerns, please don't hesitate to call.     Sincerely,         Aidan Serrato DO

## 2020-03-14 ASSESSMENT — ENCOUNTER SYMPTOMS
GASTROINTESTINAL NEGATIVE: 1
RESPIRATORY NEGATIVE: 1

## 2020-03-15 NOTE — PROGRESS NOTES
tablet TAKE 1 TABLET BY MOUTH TWO TIMES A DAY  0    propranolol (INDERAL) 20 MG tablet   1    levothyroxine (SYNTHROID) 200 MCG tablet TAKE 1 TABLET DAILY 90 tablet 0    PARoxetine (PAXIL) 30 MG tablet TAKE 1 TABLET IN THE       MORNING 90 tablet 0    guaiFENesin 400 MG tablet Take 400 mg by mouth 4 times daily as needed for Cough      phenylephrine (CLAUDIA-SYNEPHRINE) 1 % nasal spray 1 drop by Nasal route every 4 hours as needed for Congestion      pseudoephedrine-guaiFENesin (MUCINEX D)  MG per extended release tablet Take 1 tablet by mouth daily      BuPROPion HCl (WELLBUTRIN PO) Take by mouth      ATRALIN 0.05 % gel Apply a pea sized amount to face QHS 45 g 4    fluticasone (FLONASE) 50 MCG/ACT nasal spray PLACE ONE SPRAY TWICE A DAY IN EACH NOSTRIL 16 g 2       Vitals:    03/13/20 1545   BP: 126/60   Weight: 244 lb (110.7 kg)   Height: 6' (1.829 m)     Body mass index is 33.09 kg/m². Wt Readings from Last 3 Encounters:   03/13/20 244 lb (110.7 kg)   05/24/19 236 lb (107 kg)   01/07/19 233 lb (105.7 kg)     BP Readings from Last 3 Encounters:   03/13/20 126/60   05/24/19 106/70   01/07/19 122/70         Objective:   Physical Exam  Constitutional:       General: She is not in acute distress. Appearance: She is well-developed. HENT:      Head: Normocephalic. Skin:     Findings: Lesion present. Comments: Right arm with red raised vesicles    Neurological:      Mental Status: She is alert and oriented to person, place, and time. Psychiatric:         Behavior: Behavior normal.         Thought Content: Thought content normal.         Judgment: Judgment normal.         Assessment:      Assessment/plan;  Saintclair Peoples was seen today for rash and vaginal itching. Diagnoses and all orders for this visit:    Herpes zoster without complication  -     famciclovir (FAMVIR) 500 MG tablet;  Take 1 tablet by mouth 3 times daily for 10 days  -     mometasone (ELOCON) 0.1 % cream; Apply topically daily. Vaginal yeast infection  -     fluconazole (DIFLUCAN) 150 MG tablet; Take 1 tablet by mouth once for 1 dose    Adult acne  -     tretinoin (ALTRALIN) 0.05 % gel; Pea size amount on face once daily      No follow-ups on file.     Eliecer Beard DO

## 2020-08-24 ENCOUNTER — PATIENT MESSAGE (OUTPATIENT)
Dept: FAMILY MEDICINE CLINIC | Age: 40
End: 2020-08-24

## 2020-08-24 RX ORDER — FLUCONAZOLE 150 MG/1
150 TABLET ORAL ONCE
Qty: 1 TABLET | Refills: 0 | Status: SHIPPED | OUTPATIENT
Start: 2020-08-24 | End: 2020-08-24 | Stop reason: SDUPTHER

## 2020-08-24 RX ORDER — FLUCONAZOLE 150 MG/1
150 TABLET ORAL ONCE
Qty: 1 TABLET | Refills: 0 | Status: SHIPPED | OUTPATIENT
Start: 2020-08-24 | End: 2020-08-24

## 2020-08-24 NOTE — TELEPHONE ENCOUNTER
From: Bryce Stratton  To: Estevan Guaman DO  Sent: 8/24/2020 12:22 PM EDT  Subject: Prescription Question    Hello! I have a yeast infection. Are you able to send in an oral prescription to [facility] for me?

## 2020-10-11 RX ORDER — LEVOTHYROXINE SODIUM 0.2 MG/1
TABLET ORAL
Qty: 90 TABLET | Refills: 0 | Status: SHIPPED | OUTPATIENT
Start: 2020-10-11 | End: 2022-02-08 | Stop reason: SDUPTHER

## 2020-11-30 ENCOUNTER — OFFICE VISIT (OUTPATIENT)
Dept: FAMILY MEDICINE CLINIC | Age: 40
End: 2020-11-30
Payer: COMMERCIAL

## 2020-11-30 VITALS
DIASTOLIC BLOOD PRESSURE: 74 MMHG | TEMPERATURE: 98.2 F | SYSTOLIC BLOOD PRESSURE: 128 MMHG | WEIGHT: 258.7 LBS | BODY MASS INDEX: 35.04 KG/M2 | HEIGHT: 72 IN

## 2020-11-30 PROCEDURE — 99213 OFFICE O/P EST LOW 20 MIN: CPT | Performed by: FAMILY MEDICINE

## 2020-11-30 PROCEDURE — 96372 THER/PROPH/DIAG INJ SC/IM: CPT | Performed by: FAMILY MEDICINE

## 2020-11-30 RX ORDER — METHYLPREDNISOLONE ACETATE 80 MG/ML
80 INJECTION, SUSPENSION INTRA-ARTICULAR; INTRALESIONAL; INTRAMUSCULAR; SOFT TISSUE ONCE
Status: COMPLETED | OUTPATIENT
Start: 2020-11-30 | End: 2020-11-30

## 2020-11-30 RX ADMIN — METHYLPREDNISOLONE ACETATE 80 MG: 80 INJECTION, SUSPENSION INTRA-ARTICULAR; INTRALESIONAL; INTRAMUSCULAR; SOFT TISSUE at 12:21

## 2020-11-30 ASSESSMENT — PATIENT HEALTH QUESTIONNAIRE - PHQ9
SUM OF ALL RESPONSES TO PHQ QUESTIONS 1-9: 0
SUM OF ALL RESPONSES TO PHQ9 QUESTIONS 1 & 2: 0
2. FEELING DOWN, DEPRESSED OR HOPELESS: 0
SUM OF ALL RESPONSES TO PHQ QUESTIONS 1-9: 0
1. LITTLE INTEREST OR PLEASURE IN DOING THINGS: 0
SUM OF ALL RESPONSES TO PHQ QUESTIONS 1-9: 0

## 2020-11-30 ASSESSMENT — ENCOUNTER SYMPTOMS
RHINORRHEA: 1
SINUS PRESSURE: 1
RESPIRATORY NEGATIVE: 1

## 2020-11-30 NOTE — PROGRESS NOTES
Subjective:      Patient ID: Lana Fritz is a 36 y.o. female. HPI  pnd  flonase and saline spray daily not helping  netti pot  Water was coming back out clear  The drainage is worse down the back of her throat  History of recurrent sinusitis and recurrent ear infections  She got engaged and is getting  10/21 at [facility]   Review of Systems   Constitutional: Positive for activity change and fatigue. HENT: Positive for congestion, postnasal drip, rhinorrhea and sinus pressure. Respiratory: Negative. Cardiovascular: Negative. YOB: 1980    Date of Visit:  11/30/2020    No Known Allergies    Outpatient Medications Marked as Taking for the 11/30/20 encounter (Office Visit) with Amanda Scott, DO   Medication Sig Dispense Refill    levothyroxine (SYNTHROID) 200 MCG tablet TAKE 1 TABLET BY MOUTH EVERY DAY 90 tablet 0    methylphenidate (RITALIN) 5 MG tablet TAKE 1 TABLET BY MOUTH TWO TIMES A DAY  0    propranolol (INDERAL) 20 MG tablet   1    PARoxetine (PAXIL) 30 MG tablet TAKE 1 TABLET IN THE       MORNING 90 tablet 0    guaiFENesin 400 MG tablet Take 400 mg by mouth 4 times daily as needed for Cough      phenylephrine (CLAUDIA-SYNEPHRINE) 1 % nasal spray 1 drop by Nasal route every 4 hours as needed for Congestion      pseudoephedrine-guaiFENesin (MUCINEX D)  MG per extended release tablet Take 1 tablet by mouth daily      BuPROPion HCl (WELLBUTRIN PO) Take by mouth      fluticasone (FLONASE) 50 MCG/ACT nasal spray PLACE ONE SPRAY TWICE A DAY IN EACH NOSTRIL 16 g 2       Vitals:    11/30/20 1127   BP: 128/74   Temp: 98.2 °F (36.8 °C)   Weight: 258 lb 11.2 oz (117.3 kg)   Height: 6' (1.829 m)     Body mass index is 35.09 kg/m².      Wt Readings from Last 3 Encounters:   11/30/20 258 lb 11.2 oz (117.3 kg)   03/13/20 244 lb (110.7 kg)   05/24/19 236 lb (107 kg)     BP Readings from Last 3 Encounters:   11/30/20 128/74   03/13/20 126/60   05/24/19 106/70 Objective:   Physical Exam  Constitutional:       General: She is not in acute distress. Appearance: She is well-developed. HENT:      Head: Normocephalic. Right Ear: Tympanic membrane, ear canal and external ear normal.      Left Ear: Tympanic membrane, ear canal and external ear normal.      Nose: Mucosal edema present. Mouth/Throat:      Pharynx: Posterior oropharyngeal erythema present. Eyes:      General:         Left eye: Left eye discharge: pnd      Conjunctiva/sclera: Conjunctivae normal.   Neck:      Thyroid: No thyromegaly. Cardiovascular:      Rate and Rhythm: Normal rate. Pulmonary:      Effort: Pulmonary effort is normal. No respiratory distress. Breath sounds: Normal breath sounds. No wheezing or rales. Lymphadenopathy:      Cervical: Cervical adenopathy present. Skin:     General: Skin is warm and dry. Findings: No rash. Neurological:      Mental Status: She is alert and oriented to person, place, and time. Psychiatric:         Behavior: Behavior normal.         Thought Content: Thought content normal.         Judgment: Judgment normal.         Assessment:      Assessment/plan;  Alena Hahn was seen today for congestion. Diagnoses and all orders for this visit:    Recurrent sinusitis  -     CT SINUS WO CONTRAST;  Future    Other orders  -     methylPREDNISolone acetate (DEPO-MEDROL) injection 80 mg      Await ct  Discussed allergy testing in three months after the shot wears off   [address] [facility] [facility], DO

## 2020-12-10 ENCOUNTER — TELEPHONE (OUTPATIENT)
Dept: FAMILY MEDICINE CLINIC | Age: 40
End: 2020-12-10

## 2020-12-10 RX ORDER — GUAIFENESIN AND PSEUDOEPHEDRINE HCL 1200; 120 MG/1; MG/1
1 TABLET, EXTENDED RELEASE ORAL 2 TIMES DAILY PRN
Qty: 20 TABLET | Refills: 0 | Status: SHIPPED | OUTPATIENT
Start: 2020-12-10 | End: 2022-10-31

## 2020-12-10 NOTE — TELEPHONE ENCOUNTER
Copied from My Chart message:     Jonybetito Aroraes  To   [facility] Staff  Sent   12/10/2020  2:12 PM    Hello!  This is a separate issue from the CT scan message. Wilmar Crump my visit w/Dr. Lamin Peralta i have started to experience itchy ears and my throat is more sore.  Even though I had the depo medrol shot, the post nasal drip and congestion has not subsided.  I am wondering if I have a sinus infection/upper respiratory infection.  It is fairly common for me to get this at this time of the year. Heena Bounds if I need an anti-biotic.  I do not have a fever, I am not having difficulty breathing, and I can smell and taste pretty well.  Do I need to come in again for another visit?

## 2020-12-10 NOTE — TELEPHONE ENCOUNTER
What he has is most likely viral.  I have sent an Rx for Mucinex-D to [facility] on [address]. He should definitely get tested for COVID and should quarantine until the result is negative.

## 2021-01-21 DIAGNOSIS — J32.9 CHRONIC SINUSITIS, UNSPECIFIED LOCATION: Primary | ICD-10-CM

## 2021-01-27 ENCOUNTER — OFFICE VISIT (OUTPATIENT)
Dept: ENT CLINIC | Age: 41
End: 2021-01-27
Payer: COMMERCIAL

## 2021-01-27 VITALS
WEIGHT: 257 LBS | HEIGHT: 72 IN | SYSTOLIC BLOOD PRESSURE: 90 MMHG | BODY MASS INDEX: 34.81 KG/M2 | HEART RATE: 72 BPM | TEMPERATURE: 96.8 F | DIASTOLIC BLOOD PRESSURE: 60 MMHG

## 2021-01-27 DIAGNOSIS — J34.3 HYPERTROPHY OF BOTH INFERIOR NASAL TURBINATES: ICD-10-CM

## 2021-01-27 DIAGNOSIS — R06.83 SNORING: ICD-10-CM

## 2021-01-27 DIAGNOSIS — J34.2 DEVIATED NASAL SEPTUM: ICD-10-CM

## 2021-01-27 DIAGNOSIS — J34.89 NASAL OBSTRUCTION: Primary | ICD-10-CM

## 2021-01-27 DIAGNOSIS — J31.0 CHRONIC RHINITIS: ICD-10-CM

## 2021-01-27 PROCEDURE — 99204 OFFICE O/P NEW MOD 45 MIN: CPT | Performed by: STUDENT IN AN ORGANIZED HEALTH CARE EDUCATION/TRAINING PROGRAM

## 2021-01-27 PROCEDURE — 31231 NASAL ENDOSCOPY DX: CPT | Performed by: STUDENT IN AN ORGANIZED HEALTH CARE EDUCATION/TRAINING PROGRAM

## 2021-01-27 RX ORDER — FLUTICASONE PROPIONATE 50 MCG
2 SPRAY, SUSPENSION (ML) NASAL 2 TIMES DAILY
Qty: 2 BOTTLE | Refills: 5 | Status: SHIPPED | OUTPATIENT
Start: 2021-01-27

## 2021-01-27 NOTE — PROGRESS NOTES
[facility]      [facility] Name: [facility]  Medical Record Number:  [id number]  Primary Care Physician:  Joshua Salmeron DO  Date of Consultation: 1/27/2021:        Chief Complaint:   Chief Complaint   Patient presents with    New Patient     Patient is here today because she has reoccurring sinus infection and mild ear infection along with post nasal drip, she had a CT scan done on 1-        HISTORY OF PRESENT ILLNESS  Jaymie Steen is a(n) 36 y.o. female who presents today for evaluation of issues related to her sinuses. Patient states that she has a constant sensation of nasal drip. This is very irritating to her. She has tried Flonase without improvement. She also notes that she snores. This is bothering her significant other. She has intermittent difficulty breathing through her nose. She has not get frequent sinus infections. She has occasional facial pain and pressure. Her sense of smell is normal.  She had a sleep study some number of years ago and was told that she needs to wear a CPAP. I independently reviewed the patients past medical history, past surgical history, and social history. They are unremarkable except as noted in the HPI and below. The patient denies any family history related to the current complaint, and they deny any family history of bleeding disorders or difficulties with anesthesia unless noted below. Patient Active Problem List   Diagnosis    Pain in thoracic spine    Lumbago    Conductive hearing loss in right ear    Condctv hear loss, uni, right ear, w unrestr hear cntra side    ETD (Eustachian tube dysfunction), right     No past surgical history on file. No family history on file. Social History     Tobacco Use    Smoking status: Never Smoker    Smokeless tobacco: Never Used    Tobacco comment: encouraged to never smoke    Substance Use Topics    Alcohol use:  Yes     Alcohol/week: 0.0 standard drinks     Comment: occassionally maybe 2 times a week    Drug use: No        Nurse Only on 04/16/2018   Component Date Value Ref Range Status    TSH 04/16/2018 1.88  0.27 - 4.20 uIU/mL Final        DRUG/FOOD ALLERGIES: Patient has no known allergies. CURRENT MEDICATIONS  Prior to Admission medications    Medication Sig Start Date End Date Taking?  Authorizing Provider   fluticasone (FLONASE) 50 MCG/ACT nasal spray 2 sprays by Nasal route 2 times daily 1/27/21  Yes Damien Gomez MD   Pseudoephedrine-guaiFENesin 120-1200 MG TB12 Take 1 tablet by mouth 2 times daily as needed (Congestion) 12/10/20  Yes Jorge Luis Chaves DO   levothyroxine (SYNTHROID) 200 MCG tablet TAKE 1 TABLET BY MOUTH EVERY DAY 10/11/20  Yes Waldemar Washburn,    SYNTHROID 200 MCG tablet TAKE 1 TABLET DAILY 8/6/19  Yes Waldemar Washburn DO   methylphenidate (RITALIN) 5 MG tablet TAKE 1 TABLET BY MOUTH TWO TIMES A DAY 5/15/19  Yes Historical Provider, MD   levothyroxine (SYNTHROID) 200 MCG tablet TAKE 1 TABLET DAILY 5/8/19  Yes Waldemar Washburn DO   PARoxetine (PAXIL) 30 MG tablet TAKE 1 TABLET IN THE       MORNING 3/1/19  Yes Waldemar Washburn DO   guaiFENesin 400 MG tablet Take 400 mg by mouth 4 times daily as needed for Cough   Yes Historical Provider, MD   phenylephrine (CLAUDIA-SYNEPHRINE) 1 % nasal spray 1 drop by Nasal route every 4 hours as needed for Congestion   Yes Historical Provider, MD   pseudoephedrine-guaiFENesin (MUCINEX D)  MG per extended release tablet Take 1 tablet by mouth daily   Yes Historical Provider, MD   BuPROPion HCl (WELLBUTRIN PO) Take by mouth   Yes Historical Provider, MD       REVIEW OF SYSTEMS  The following systems were reviewed and revealed the following in addition to any already discussed in the HPI:    CONSTITUTIONAL: no weight loss, no fever, no night sweats, no chills  EYES: no vision changes, no blurry vision  EARS: no changes in hearing, no otalgia  NOSE: no epistaxis, no rhinorrhea  RESPIRATORY: no  Difficulty breathing, no shortness of breath  CV: no chest pain, no Peripheral vascular disease  HEME: No coagulation disorder, no Bleeding disorder  NEURO: no TIA or stroke-like symptoms  SKIN: No new rashes in the head and neck, no recent skin cancers  MOUTH: No new ulcers, no recent teeth infections  GASTROINTESTINAL: No diarrhea, stomach pain  PSYCH: No anxiety, no depression      PHYSICAL EXAM  BP 90/60   Pulse 72   Temp 96.8 °F (36 °C)   Ht 6' (1.829 m)   Wt 257 lb (116.6 kg)   BMI 34.86 kg/m²     GENERAL: No acute distress, alert and oriented, no hoarseness, strong voice  EYES: EOMI, Anti-icteric  HENT:   Head: Normocephalic and atraumatic. Face:  Symmetric, facial nerve intact, no sinus tenderness  Right Ear: Normal external ear, normal external auditory canal, intact tympanic membrane with normal mobility and aerated middle ear  Left Ear: Normal external ear, normal external auditory canal, intact tympanic membrane with normal mobility and aerated middle ear  Mouth/Oral Cavity:  normal lips, Uvula is midline, no mucosal lesions, no trismus, normal dentition, normal salivary quality/flow  Oropharynx/Larynx:  normal oropharynx, normal tonsils; patient did not tolerate mirror exam due to excessive gag reflex  Nose:Normal external nasal appearance. Anterior rhinoscopy shows  a deviated septum preventing view posteriorly. Hypertrophy turbinates. Normal mucosa   NECK: Normal range of motion, no thyromegaly, trachea is midline, no lymphadenopathy, no neck masses, no crepitus  CHEST: Normal respiratory effort, no retractions, breathing comfortably  SKIN: No rashes, normal appearing skin, no evidence of skin lesions/tumors  Neuro:  cranial nerve II-XII intact; normal gait  Cardio:  no edema        PROCEDURE  Nasal Endoscopy (CPT code 33699)    Preop: chronic rhinitis  Postop: Same    Verbal consent was received.  After topical anesthesia and decongestion had been obtained using aerosolized 1% lidocaine and oxymetazoline, a 45 degree rigid endoscope was placed into both nares with the patient in a sitting position. The following was observed:    Right Nasal Cavity and Paranasal Sinuses:  Polyp score = 0 (0 = no polyps, 1 = small polyps in middle meatus not reaching below the inferior border of the middle chery, 2 = polyps reaching below the middle border of the middle turbinate, 3= large polyps reaching the lower border of the inferior turbinate or polyps medial to the middle chery, 4= large polyps causing almost complete congestion/obstruction of the interior meatus)  Edema score = 2 (0 = absent, 1 = mild, 2 = severe)  Discharge score = 1 (0 = no discharge, 1 = clear thin discharge, 2 = thick purulent discharge)    Left Nasal Cavity and Paranasal Sinuses:    Polyp score = 0 (0 = no polyps, 1 = small polyps in middle meatus not reaching below the inferior border of the middle chery, 2 = polyps reaching below the middle border of the middle turbinate, 3= large polyps reaching the lower border of the inferior turbinate or polyps medial to the middle chery, 4= large polyps causing almost complete congestion/obstruction of the interior meatus)  Edema score = 2 (0 = absent, 1 = mild, 2 = severe)  Discharge score = 1 (0 = no discharge, 1 = clear thin discharge, 2 = thick purulent discharge)    Septum: intact and deviated   Other:   -The inferior and middle turbinates were examined. The middle meatus, and sphenoethmoid recess was examined bilaterally.    -There is a high right sided septal deviation and a left-sided low maxillary crest deviation. There is bilateral inferior turbinate hypertrophy. There is no significant adenoid tissue.  -There were no complications. Tolerated well without complication. I attest that I was present for and did the entire procedure myself. ASSESSMENT/PLAN  1. Nasal obstruction      2. Deviated nasal septum      3.  Hypertrophy of both inferior nasal

## 2021-02-01 RX ORDER — LEVOTHYROXINE SODIUM 0.2 MG/1
TABLET ORAL
Qty: 90 TABLET | Refills: 0 | Status: SHIPPED | OUTPATIENT
Start: 2021-02-01 | End: 2021-05-18 | Stop reason: SDUPTHER

## 2021-03-09 ENCOUNTER — OFFICE VISIT (OUTPATIENT)
Dept: ENT CLINIC | Age: 41
End: 2021-03-09
Payer: COMMERCIAL

## 2021-03-09 VITALS
DIASTOLIC BLOOD PRESSURE: 62 MMHG | SYSTOLIC BLOOD PRESSURE: 92 MMHG | OXYGEN SATURATION: 100 % | TEMPERATURE: 97.6 F | BODY MASS INDEX: 35.4 KG/M2 | WEIGHT: 261 LBS | HEART RATE: 75 BPM

## 2021-03-09 DIAGNOSIS — J31.0 CHRONIC RHINITIS: ICD-10-CM

## 2021-03-09 DIAGNOSIS — J34.89 NASAL OBSTRUCTION: Primary | ICD-10-CM

## 2021-03-09 DIAGNOSIS — R06.83 SNORING: ICD-10-CM

## 2021-03-09 DIAGNOSIS — J34.2 DEVIATED NASAL SEPTUM: ICD-10-CM

## 2021-03-09 DIAGNOSIS — J34.3 HYPERTROPHY OF BOTH INFERIOR NASAL TURBINATES: ICD-10-CM

## 2021-03-09 PROCEDURE — 99214 OFFICE O/P EST MOD 30 MIN: CPT | Performed by: STUDENT IN AN ORGANIZED HEALTH CARE EDUCATION/TRAINING PROGRAM

## 2021-03-09 PROCEDURE — 31231 NASAL ENDOSCOPY DX: CPT | Performed by: STUDENT IN AN ORGANIZED HEALTH CARE EDUCATION/TRAINING PROGRAM

## 2021-03-09 RX ORDER — DOXYCYCLINE HYCLATE 100 MG
100 TABLET ORAL 2 TIMES DAILY
Qty: 20 TABLET | Refills: 0 | Status: CANCELLED | OUTPATIENT
Start: 2021-03-09 | End: 2021-03-19

## 2021-03-09 RX ORDER — AZELASTINE 1 MG/ML
2 SPRAY, METERED NASAL 2 TIMES DAILY
Qty: 4 BOTTLE | Refills: 1 | Status: SHIPPED | OUTPATIENT
Start: 2021-03-09 | End: 2021-06-15

## 2021-03-09 NOTE — PROGRESS NOTES
[facility]      Patient Name: Tej Phillips  Medical Record Number:  [id number]  Primary Care Physician:  Juliann Monteiro DO  Date of Consultation: 1/27/2021:        Chief Complaint:   Chief Complaint   Patient presents with    Follow-up     to sinus, still has some drainage, feels like she may have some seasonal allergies, drainage to the back of throat         HISTORY OF PRESENT ILLNESS  Leonel Carlisle is a(n) 36 y.o. female who presents today for evaluation of issues related to her sinuses. Patient states that she has a constant sensation of nasal drip. This is very irritating to her. She has tried Flonase without improvement. She also notes that she snores. This is bothering her significant other. She has intermittent difficulty breathing through her nose. She has not get frequent sinus infections. She has occasional facial pain and pressure. Her sense of smell is normal.  She had a sleep study some number of years ago and was told that she needs to wear a CPAP. Update 3/9/2021:    Patient presents today for follow-up. She has been using X layer and fluticasone with moderate improvement of her symptoms. Overall she is still getting some nasal drainage and occasional nasal blockage. Patient Active Problem List   Diagnosis    Pain in thoracic spine    Lumbago    Conductive hearing loss in right ear    Condctv hear loss, uni, right ear, w unrestr hear cntra side    ETD (Eustachian tube dysfunction), right     No past surgical history on file. No family history on file. Social History     Tobacco Use    Smoking status: Never Smoker    Smokeless tobacco: Never Used    Tobacco comment: encouraged to never smoke    Substance Use Topics    Alcohol use:  Yes     Alcohol/week: 0.0 standard drinks     Comment: occassionally maybe 2 times a week    Drug use: No        Nurse Only on 04/16/2018   Component Date Value Ref Range Status  TSH 04/16/2018 1.88  0.27 - 4.20 uIU/mL Final        DRUG/FOOD ALLERGIES: Patient has no known allergies. CURRENT MEDICATIONS  Prior to Admission medications    Medication Sig Start Date End Date Taking?  Authorizing Provider   levothyroxine (SYNTHROID) 200 MCG tablet TAKE 1 TABLET DAILY 2/1/21   Waldemar Washburn, DO   fluticasone (FLONASE) 50 MCG/ACT nasal spray 2 sprays by Nasal route 2 times daily 1/27/21   Tiffanie Melendez MD   Pseudoephedrine-guaiFENesin 120-1200 MG TB12 Take 1 tablet by mouth 2 times daily as needed (Congestion) 12/10/20   [facility], DO   levothyroxine (SYNTHROID) 200 MCG tablet TAKE 1 TABLET BY MOUTH EVERY DAY 10/11/20   Waldemar Washburn, DO   methylphenidate (RITALIN) 5 MG tablet TAKE 1 TABLET BY MOUTH TWO TIMES A DAY 5/15/19   Historical Provider, MD   levothyroxine (SYNTHROID) 200 MCG tablet TAKE 1 TABLET DAILY 5/8/19   Waldemar Washburn,    PARoxetine (PAXIL) 30 MG tablet TAKE 1 TABLET IN THE       MORNING 3/1/19   Waldemar Washburn, DO   guaiFENesin 400 MG tablet Take 400 mg by mouth 4 times daily as needed for Cough    Historical Provider, MD   phenylephrine (CLAUDIA-SYNEPHRINE) 1 % nasal spray 1 drop by Nasal route every 4 hours as needed for Congestion    Historical Provider, MD   pseudoephedrine-guaiFENesin ([place] [address] D)  MG per extended release tablet Take 1 tablet by mouth daily    Historical Provider, MD   BuPROPion HCl (WELLBUTRIN PO) Take by mouth    Historical Provider, MD       REVIEW OF SYSTEMS  The following systems were reviewed and revealed the following in addition to any already discussed in the HPI:    CONSTITUTIONAL: no weight loss, no fever, no night sweats, no chills  EYES: no vision changes, no blurry vision  EARS: no changes in hearing, no otalgia  NOSE: no epistaxis, no rhinorrhea  RESPIRATORY: no  Difficulty breathing, no shortness of breath  CV: no chest pain, no Peripheral vascular disease  HEME: No coagulation disorder, no Bleeding disorder  NEURO: no TIA or stroke-like symptoms  SKIN: No new rashes in the head and neck, no recent skin cancers  MOUTH: No new ulcers, no recent teeth infections  GASTROINTESTINAL: No diarrhea, stomach pain  PSYCH: No anxiety, no depression      PHYSICAL EXAM  BP 92/62 (Site: Right Upper Arm, Position: Sitting, Cuff Size: Large Adult)   Pulse 75   Temp 97.6 °F (36.4 °C) (Temporal)   Wt 261 lb (118.4 kg)   SpO2 100%   BMI 35.40 kg/m²     GENERAL: No acute distress, alert and oriented, no hoarseness, strong voice  EYES: EOMI, Anti-icteric  HENT:   Head: Normocephalic and atraumatic. Face:  Symmetric, facial nerve intact, no sinus tenderness  Right Ear: Normal external ear, normal external auditory canal, intact tympanic membrane with normal mobility and aerated middle ear  Left Ear: Normal external ear, normal external auditory canal, intact tympanic membrane with normal mobility and aerated middle ear  Mouth/Oral Cavity:  normal lips, Uvula is midline, no mucosal lesions, no trismus, normal dentition, normal salivary quality/flow  Oropharynx/Larynx:  normal oropharynx, normal tonsils; patient did not tolerate mirror exam due to excessive gag reflex  Nose:Normal external nasal appearance. Anterior rhinoscopy shows  a deviated septum preventing view posteriorly. Hypertrophy turbinates. Normal mucosa   NECK: Normal range of motion, no thyromegaly, trachea is midline, no lymphadenopathy, no neck masses, no crepitus  CHEST: Normal respiratory effort, no retractions, breathing comfortably  SKIN: No rashes, normal appearing skin, no evidence of skin lesions/tumors  Neuro:  cranial nerve II-XII intact; normal gait  Cardio:  no edema        PROCEDURE  Nasal Endoscopy (CPT code 69507)    Preop: chronic rhinitis  Postop: Same    Verbal consent was received.  After topical anesthesia and decongestion had been obtained using aerosolized 1% lidocaine and oxymetazoline, a 45 degree rigid endoscope was placed into both nares with the patient in a sitting position. The following was observed:    Right Nasal Cavity and Paranasal Sinuses:  Polyp score = 0 (0 = no polyps, 1 = small polyps in middle meatus not reaching below the inferior border of the middle chery, 2 = polyps reaching below the middle border of the middle turbinate, 3= large polyps reaching the lower border of the inferior turbinate or polyps medial to the middle chery, 4= large polyps causing almost complete congestion/obstruction of the interior meatus)  Edema score = 2 (0 = absent, 1 = mild, 2 = severe)  Discharge score = 1 (0 = no discharge, 1 = clear thin discharge, 2 = thick purulent discharge)    Left Nasal Cavity and Paranasal Sinuses:    Polyp score = 0 (0 = no polyps, 1 = small polyps in middle meatus not reaching below the inferior border of the middle chery, 2 = polyps reaching below the middle border of the middle turbinate, 3= large polyps reaching the lower border of the inferior turbinate or polyps medial to the middle chery, 4= large polyps causing almost complete congestion/obstruction of the interior meatus)  Edema score = 2 (0 = absent, 1 = mild, 2 = severe)  Discharge score = 1 (0 = no discharge, 1 = clear thin discharge, 2 = thick purulent discharge)    Septum: intact and deviated   Other:   -The inferior and middle turbinates were examined. The middle meatus, and sphenoethmoid recess was examined bilaterally.    -There is a high right sided septal deviation and a left-sided low maxillary crest deviation. There is bilateral inferior turbinate hypertrophy. Overall her sinonasal inflammation is moderately improved. -There were no complications. Tolerated well without complication. I attest that I was present for and did the entire procedure myself. ASSESSMENT/PLAN  1. Nasal obstruction      2. Deviated nasal septum      3. Hypertrophy of both inferior nasal turbinates      4. Chronic rhinitis      5.  Snoring      This very pleasant patient here today for follow-up regarding her chronic sinonasal complaints. Overall she has had some improvement of her nasal drainage. She is currently using Xlear and fluticasone nasal sprays. We discussed potential allergy testing and starting immunotherapy, however she would like to hold off on any allergy work-up at this time which I think is reasonable. I would like to add azelastine 2 sprays twice daily to her regimen to see if this can help get her through allergy season. She does have evidence of a septal deviation and inferior turbinate hypertrophy. We discussed that any surgery to address her nasal airway obstruction would not necessarily make any change to her snoring or nasal drainage. We will see how she responds to conservative management and if does not improve, we can discuss other surgical and/or medical options. This note was generated completely or in part utilizing Dragon dictation speech recognition software. Occasionally, words are mistranscribed and despite editing, the text may contain inaccuracies due to incorrect word recognition. If further clarification is needed please contact the office at [phone].

## 2021-03-20 ENCOUNTER — PATIENT MESSAGE (OUTPATIENT)
Dept: FAMILY MEDICINE CLINIC | Age: 41
End: 2021-03-20

## 2021-03-20 RX ORDER — PAROXETINE 30 MG/1
30 TABLET, FILM COATED ORAL DAILY
Qty: 30 TABLET | Refills: 3 | Status: SHIPPED | OUTPATIENT
Start: 2021-03-20 | End: 2021-06-13

## 2021-03-20 NOTE — TELEPHONE ENCOUNTER
From: Ronald Rhoades  To: Asha DO Carin  Sent: 3/20/2021 11:14 AM EDT  Subject: Prescription Question    Hello! I had messaged Dr. Jong christie back (last Fall I think) about managing my prescriptions that were formerly prescribed by my psychiatrist (who retired). I take bupropion, methylphenidate, and paroxetine. I just realized I am out of paroxetine and don't have a refill. I have a refill or script still for the others. Would Dr. Bienvenido Gupta be able to call in my paroxetine? It is 30 MG paroxetine HCL. I use [facility] at [address] [place] [address]. I can schedule a check up with Dr. Bienvenido Gupta to discuss this if that is needed. Would a telehealth session be okay? Thank you!

## 2021-05-18 ENCOUNTER — PATIENT MESSAGE (OUTPATIENT)
Dept: FAMILY MEDICINE CLINIC | Age: 41
End: 2021-05-18

## 2021-05-18 RX ORDER — LEVOTHYROXINE SODIUM 0.2 MG/1
TABLET ORAL
Qty: 90 TABLET | Refills: 0 | Status: SHIPPED | OUTPATIENT
Start: 2021-05-18 | End: 2021-08-11

## 2021-05-18 NOTE — TELEPHONE ENCOUNTER
From: Maru Balderrama  To: [employer] [facility],   Sent: 5/18/2021 7:51 AM EDT  Subject: Emmanuel Alcaraz! I need a refill on my levothyroxine. I moved recently and I use [facility] at [address] [place] [address]. Thank you!

## 2021-06-13 RX ORDER — PAROXETINE 30 MG/1
TABLET, FILM COATED ORAL
Qty: 90 TABLET | Refills: 1 | Status: SHIPPED | OUTPATIENT
Start: 2021-06-13 | End: 2022-02-13

## 2021-06-15 ENCOUNTER — OFFICE VISIT (OUTPATIENT)
Dept: ENT CLINIC | Age: 41
End: 2021-06-15
Payer: COMMERCIAL

## 2021-06-15 VITALS
DIASTOLIC BLOOD PRESSURE: 80 MMHG | HEIGHT: 72 IN | BODY MASS INDEX: 36.3 KG/M2 | WEIGHT: 268 LBS | SYSTOLIC BLOOD PRESSURE: 124 MMHG

## 2021-06-15 DIAGNOSIS — K21.9 LARYNGOPHARYNGEAL REFLUX (LPR): Primary | ICD-10-CM

## 2021-06-15 DIAGNOSIS — J34.89 NASAL OBSTRUCTION: ICD-10-CM

## 2021-06-15 DIAGNOSIS — R06.83 SNORING: ICD-10-CM

## 2021-06-15 DIAGNOSIS — J34.2 DEVIATED NASAL SEPTUM: ICD-10-CM

## 2021-06-15 DIAGNOSIS — J34.3 HYPERTROPHY OF BOTH INFERIOR NASAL TURBINATES: ICD-10-CM

## 2021-06-15 DIAGNOSIS — J31.0 CHRONIC RHINITIS: ICD-10-CM

## 2021-06-15 PROCEDURE — 99214 OFFICE O/P EST MOD 30 MIN: CPT | Performed by: STUDENT IN AN ORGANIZED HEALTH CARE EDUCATION/TRAINING PROGRAM

## 2021-06-15 RX ORDER — OMEPRAZOLE 40 MG/1
CAPSULE, DELAYED RELEASE ORAL
Qty: 30 CAPSULE | Refills: 1 | Status: SHIPPED | OUTPATIENT
Start: 2021-06-15 | End: 2021-07-13

## 2021-06-15 NOTE — PROGRESS NOTES
[facility]      Patient Name: Fletcher Harrington  Medical Record Number:  [id number]  Primary Care Physician:  Kvng Parmar DO  Date of Consultation: 1/27/2021:        Chief Complaint:   Chief Complaint   Patient presents with    Follow-up    Sinus Problem        HISTORY OF PRESENT ILLNESS  Marco Perez is a(n) 36 y.o. female who presents today for evaluation of issues related to her sinuses. Patient states that she has a constant sensation of nasal drip. This is very irritating to her. She has tried Flonase without improvement. She also notes that she snores. This is bothering her significant other. She has intermittent difficulty breathing through her nose. She has not get frequent sinus infections. She has occasional facial pain and pressure. Her sense of smell is normal.  She had a sleep study some number of years ago and was told that she needs to wear a CPAP. Update 3/9/2021:    Patient presents today for follow-up. She has been using X layer and fluticasone with moderate improvement of her symptoms. Overall she is still getting some nasal drainage and occasional nasal blockage. Update 6/15/2021:  Getting lots of phlegm in throat  Breathing through nose ok  Using Xlear, astelin, flonase and Mucinex D and claratin      Patient Active Problem List   Diagnosis    Pain in thoracic spine    Lumbago    Conductive hearing loss in right ear    Condctv hear loss, uni, right ear, w unrestr hear cntra side    ETD (Eustachian tube dysfunction), right     No past surgical history on file. No family history on file. Social History     Tobacco Use    Smoking status: Never Smoker    Smokeless tobacco: Never Used    Tobacco comment: encouraged to never smoke    Substance Use Topics    Alcohol use:  Yes     Alcohol/week: 0.0 standard drinks     Comment: occassionally maybe 2 times a week    Drug use: No        Nurse Only on 04/16/2018 blurry vision  EARS: no changes in hearing, no otalgia  NOSE: no epistaxis, no rhinorrhea  RESPIRATORY: no  Difficulty breathing, no shortness of breath  CV: no chest pain, no Peripheral vascular disease  HEME: No coagulation disorder, no Bleeding disorder  NEURO: no TIA or stroke-like symptoms  SKIN: No new rashes in the head and neck, no recent skin cancers  MOUTH: No new ulcers, no recent teeth infections  GASTROINTESTINAL: No diarrhea, stomach pain  PSYCH: No anxiety, no depression      PHYSICAL EXAM  /80 (Site: Left Upper Arm)   Ht 6' (1.829 m)   Wt 268 lb (121.6 kg)   BMI 36.35 kg/m²     GENERAL: No acute distress, alert and oriented, no hoarseness, strong voice  EYES: EOMI, Anti-icteric  HENT:   Head: Normocephalic and atraumatic. Face:  Symmetric, facial nerve intact, no sinus tenderness  Right Ear: Normal external ear, normal external auditory canal, intact tympanic membrane with normal mobility and aerated middle ear  Left Ear: Normal external ear, normal external auditory canal, intact tympanic membrane with normal mobility and aerated middle ear  Mouth/Oral Cavity:  normal lips, Uvula is midline, no mucosal lesions, no trismus, normal dentition, normal salivary quality/flow  Oropharynx/Larynx:  normal oropharynx, normal tonsils; patient did not tolerate mirror exam due to excessive gag reflex  Nose:Normal external nasal appearance. Anterior rhinoscopy shows  a deviated septum preventing view posteriorly. Hypertrophy turbinates. Normal mucosa   NECK: Normal range of motion, no thyromegaly, trachea is midline, no lymphadenopathy, no neck masses, no crepitus  CHEST: Normal respiratory effort, no retractions, breathing comfortably  SKIN: No rashes, normal appearing skin, no evidence of skin lesions/tumors  Neuro:  cranial nerve II-XII intact; normal gait  Cardio:  no edema        PROCEDURE  Nasal Endoscopy (CPT code 93613)    Preop: chronic rhinitis  Postop: Same    Verbal consent was received. After topical anesthesia and decongestion had been obtained using aerosolized 1% lidocaine and oxymetazoline, a 45 degree rigid endoscope was placed into both nares with the patient in a sitting position. The following was observed:    Right Nasal Cavity and Paranasal Sinuses:  Polyp score = 0 (0 = no polyps, 1 = small polyps in middle meatus not reaching below the inferior border of the middle chery, 2 = polyps reaching below the middle border of the middle turbinate, 3= large polyps reaching the lower border of the inferior turbinate or polyps medial to the middle chery, 4= large polyps causing almost complete congestion/obstruction of the interior meatus)  Edema score = 2 (0 = absent, 1 = mild, 2 = severe)  Discharge score = 1 (0 = no discharge, 1 = clear thin discharge, 2 = thick purulent discharge)    Left Nasal Cavity and Paranasal Sinuses:    Polyp score = 0 (0 = no polyps, 1 = small polyps in middle meatus not reaching below the inferior border of the middle chery, 2 = polyps reaching below the middle border of the middle turbinate, 3= large polyps reaching the lower border of the inferior turbinate or polyps medial to the middle chery, 4= large polyps causing almost complete congestion/obstruction of the interior meatus)  Edema score = 2 (0 = absent, 1 = mild, 2 = severe)  Discharge score = 1 (0 = no discharge, 1 = clear thin discharge, 2 = thick purulent discharge)    Septum: intact and deviated   Other:   -The inferior and middle turbinates were examined. The middle meatus, and sphenoethmoid recess was examined bilaterally.    -There is a high right sided septal deviation and a left-sided low maxillary crest deviation. There is bilateral inferior turbinate hypertrophy. Overall her sinonasal inflammation is moderately improved. -There were no complications. Tolerated well without complication. I attest that I was present for and did the entire procedure myself. ASSESSMENT/PLAN  1.  Nasal obstruction      2. Deviated nasal septum      3. Hypertrophy of both inferior nasal turbinates      4. Chronic rhinitis      5. Snoring      This very pleasant patient here today for follow-up regarding her chronic sinonasal complaints. Overall she has had some improvement of her nasal drainage. She is currently using Xlear and fluticasone nasal sprays. We discussed potential allergy testing and starting immunotherapy, however she would like to hold off on any allergy work-up at this time which I think is reasonable. She did not have any improvement with azelastine and I have asked her to stop this for now. I did refill her fluticasone. Additionally I will start her on omeprazole 40 mg to address any underlying reflux which could be causing her sensation of phlegm in her throat. She does have evidence of a septal deviation and inferior turbinate hypertrophy. We discussed that any surgery to address her nasal airway obstruction would not necessarily make any change to her snoring or nasal drainage. We will see how she responds to conservative management and if does not improve, we can discuss other surgical and/or medical options. This note was generated completely or in part utilizing Dragon dictation speech recognition software. Occasionally, words are mistranscribed and despite editing, the text may contain inaccuracies due to incorrect word recognition. If further clarification is needed please contact the office at [phone].

## 2021-07-13 RX ORDER — OMEPRAZOLE 40 MG/1
CAPSULE, DELAYED RELEASE ORAL
Qty: 30 CAPSULE | Refills: 1 | Status: SHIPPED | OUTPATIENT
Start: 2021-07-13 | End: 2021-10-12

## 2021-08-11 RX ORDER — LEVOTHYROXINE SODIUM 0.2 MG/1
TABLET ORAL
Qty: 90 TABLET | Refills: 0 | Status: SHIPPED | OUTPATIENT
Start: 2021-08-11 | End: 2021-11-09

## 2021-09-08 ENCOUNTER — OFFICE VISIT (OUTPATIENT)
Dept: FAMILY MEDICINE CLINIC | Age: 41
End: 2021-09-08
Payer: COMMERCIAL

## 2021-09-08 VITALS
SYSTOLIC BLOOD PRESSURE: 80 MMHG | HEIGHT: 72 IN | BODY MASS INDEX: 35.19 KG/M2 | HEART RATE: 92 BPM | OXYGEN SATURATION: 97 % | DIASTOLIC BLOOD PRESSURE: 62 MMHG | WEIGHT: 259.8 LBS

## 2021-09-08 DIAGNOSIS — Z00.00 WELL ADULT EXAM: Primary | ICD-10-CM

## 2021-09-08 PROCEDURE — 99396 PREV VISIT EST AGE 40-64: CPT | Performed by: FAMILY MEDICINE

## 2021-09-08 SDOH — ECONOMIC STABILITY: FOOD INSECURITY: WITHIN THE PAST 12 MONTHS, THE FOOD YOU BOUGHT JUST DIDN'T LAST AND YOU DIDN'T HAVE MONEY TO GET MORE.: NEVER TRUE

## 2021-09-08 SDOH — ECONOMIC STABILITY: FOOD INSECURITY: WITHIN THE PAST 12 MONTHS, YOU WORRIED THAT YOUR FOOD WOULD RUN OUT BEFORE YOU GOT MONEY TO BUY MORE.: NEVER TRUE

## 2021-09-08 ASSESSMENT — ENCOUNTER SYMPTOMS
RESPIRATORY NEGATIVE: 1
GASTROINTESTINAL NEGATIVE: 1

## 2021-09-08 ASSESSMENT — PATIENT HEALTH QUESTIONNAIRE - PHQ9
SUM OF ALL RESPONSES TO PHQ QUESTIONS 1-9: 0
SUM OF ALL RESPONSES TO PHQ9 QUESTIONS 1 & 2: 0
2. FEELING DOWN, DEPRESSED OR HOPELESS: 0
SUM OF ALL RESPONSES TO PHQ QUESTIONS 1-9: 0
SUM OF ALL RESPONSES TO PHQ QUESTIONS 1-9: 0
1. LITTLE INTEREST OR PLEASURE IN DOING THINGS: 0

## 2021-09-08 ASSESSMENT — SOCIAL DETERMINANTS OF HEALTH (SDOH): HOW HARD IS IT FOR YOU TO PAY FOR THE VERY BASICS LIKE FOOD, HOUSING, MEDICAL CARE, AND HEATING?: NOT HARD AT ALL

## 2021-09-08 NOTE — PROGRESS NOTES
OUTPATIENT PROGRESS NOTE  Date of Service:  9/8/2021  Address: [facility]  [address] [facility],First [address] [address]  Dept: [phone]  Loc: [phone]    Subjective:      Patient ID:  [id number]  Tom Adams is a 36 y.o. female     HPI  Well exam  She is doing fine   Getting  in October  Feels good  No concerns     Review of Systems   Constitutional: Negative. HENT: Negative. Respiratory: Negative. Cardiovascular: Negative. Gastrointestinal: Negative. Neurological: Negative. Psychiatric/Behavioral: Positive for decreased concentration. Objective:   YOB: 1980    Date of Visit:  9/8/2021     No Known Allergies    No outpatient medications have been marked as taking for the 9/8/21 encounter (Office Visit) with Oliver Hendricks DO. Vitals:    09/08/21 1428   BP: 80/62   Site: Right Upper Arm   Position: Sitting   Cuff Size: Large Adult   Pulse: 92   SpO2: 97%   Weight: 259 lb 12.8 oz (117.8 kg)   Height: 6' (1.829 m)     Body mass index is 35.24 kg/m².      Wt Readings from Last 3 Encounters:   09/08/21 259 lb 12.8 oz (117.8 kg)   06/15/21 268 lb (121.6 kg)   03/09/21 261 lb (118.4 kg)     BP Readings from Last 3 Encounters:   09/08/21 80/62   06/15/21 124/80   03/09/21 92/62     No Known Allergies  Current Outpatient Medications   Medication Sig Dispense Refill    levothyroxine (SYNTHROID) 200 MCG tablet TAKE 1 TABLET BY MOUTH EVERY DAY 90 tablet 0    omeprazole (PRILOSEC) 40 MG delayed release capsule TAKE 1 CAPSULE BY MOUTH 30 MINUTES PRIOR TO EVENING MEAL 30 capsule 1    PARoxetine (PAXIL) 30 MG tablet TAKE 1 TABLET BY MOUTH EVERY DAY 90 tablet 1    fluticasone (FLONASE) 50 MCG/ACT nasal spray 2 sprays by Nasal route 2 times daily 2 Bottle 5    Pseudoephedrine-guaiFENesin 120-1200 MG TB12 Take 1 tablet by mouth 2 times daily as needed (Congestion) 20 tablet 0    levothyroxine (SYNTHROID) 200 MCG tablet TAKE 1 TABLET BY MOUTH EVERY DAY 90 tablet 0    methylphenidate (RITALIN) 5 MG tablet TAKE 1 TABLET BY MOUTH TWO TIMES A DAY  0    levothyroxine (SYNTHROID) 200 MCG tablet TAKE 1 TABLET DAILY 90 tablet 0    PARoxetine (PAXIL) 30 MG tablet TAKE 1 TABLET IN THE       MORNING 90 tablet 0    guaiFENesin 400 MG tablet Take 400 mg by mouth 4 times daily as needed for Cough      BuPROPion HCl (WELLBUTRIN PO) Take by mouth       No current facility-administered medications for this visit. Past Medical History:   Diagnosis Date    Anxiety     Depression     Hypothyroidism      History reviewed. No pertinent surgical history. Social History     Tobacco Use    Smoking status: Never Smoker    Smokeless tobacco: Never Used    Tobacco comment: encouraged to never smoke    Substance Use Topics    Alcohol use: Yes     Alcohol/week: 0.0 standard drinks     Comment: occassionally maybe 2 times a week    Drug use: No     History reviewed. No pertinent family history. Physical Exam  Constitutional:       General: She is not in acute distress. Appearance: She is well-developed. HENT:      Head: Normocephalic. Neurological:      Mental Status: She is alert and oriented to person, place, and time. Psychiatric:         Behavior: Behavior normal.         Thought Content: Thought content normal.         Judgment: Judgment normal.            Assessment/Plan        Assessment/plan;  Bonita Noguera was seen today for annual exam.    Diagnoses and all orders for this visit:    Well adult exam    discussed healthy lifestyle  Return if symptoms worsen or fail to improve.                Alexandra Duque, DO

## 2021-09-28 ENCOUNTER — OFFICE VISIT (OUTPATIENT)
Dept: ENT CLINIC | Age: 41
End: 2021-09-28
Payer: COMMERCIAL

## 2021-09-28 VITALS
DIASTOLIC BLOOD PRESSURE: 82 MMHG | SYSTOLIC BLOOD PRESSURE: 124 MMHG | HEIGHT: 72 IN | BODY MASS INDEX: 34.81 KG/M2 | WEIGHT: 257 LBS

## 2021-09-28 DIAGNOSIS — J34.89 NASAL OBSTRUCTION: ICD-10-CM

## 2021-09-28 DIAGNOSIS — K21.9 LARYNGOPHARYNGEAL REFLUX (LPR): Primary | ICD-10-CM

## 2021-09-28 DIAGNOSIS — J31.0 CHRONIC RHINITIS: ICD-10-CM

## 2021-09-28 DIAGNOSIS — J34.3 HYPERTROPHY OF BOTH INFERIOR NASAL TURBINATES: ICD-10-CM

## 2021-09-28 DIAGNOSIS — R06.83 SNORING: ICD-10-CM

## 2021-09-28 DIAGNOSIS — J34.2 DEVIATED NASAL SEPTUM: ICD-10-CM

## 2021-09-28 PROCEDURE — 99213 OFFICE O/P EST LOW 20 MIN: CPT | Performed by: STUDENT IN AN ORGANIZED HEALTH CARE EDUCATION/TRAINING PROGRAM

## 2021-09-28 NOTE — PROGRESS NOTES
[facility]      Patient Name: Lana Fritz  Medical Record Number:  [id number]  Primary Care Physician:  Alexandra Duque DO  Date of Consultation: 1/27/2021:        Chief Complaint:   Chief Complaint   Patient presents with    3 Month Follow-Up        HISTORY OF PRESENT ILLNESS  Bonita Noguera is a(n) 36 y.o. female who presents today for evaluation of issues related to her sinuses. Patient states that she has a constant sensation of nasal drip. This is very irritating to her. She has tried Flonase without improvement. She also notes that she snores. This is bothering her significant other. She has intermittent difficulty breathing through her nose. She has not get frequent sinus infections. She has occasional facial pain and pressure. Her sense of smell is normal.  She had a sleep study some number of years ago and was told that she needs to wear a CPAP. Update 3/9/2021:    Patient presents today for follow-up. She has been using X layer and fluticasone with moderate improvement of her symptoms. Overall she is still getting some nasal drainage and occasional nasal blockage. Update 6/15/2021:  Getting lots of phlegm in throat  Breathing through nose ok  Using Xlear, astelin, flonase and Mucinex D and claratin    Update 9/28/2020 phone:    The patient was previously using XL ear, Astelin, Flonase and Mucinex D and Claritin. The omeprazole has been helping. Is interested in the inspire. She is getting  in October. Patient Active Problem List   Diagnosis    Pain in thoracic spine    Lumbago    Conductive hearing loss in right ear    Condctv hear loss, uni, right ear, w unrestr hear cntra side    ETD (Eustachian tube dysfunction), right     No past surgical history on file. No family history on file.   Social History     Tobacco Use    Smoking status: Never Smoker    Smokeless tobacco: Never Used    Tobacco comment: encouraged to never smoke    Substance Use Topics    Alcohol use: Yes     Alcohol/week: 0.0 standard drinks     Comment: occassionally maybe 2 times a week    Drug use: No        Nurse Only on 04/16/2018   Component Date Value Ref Range Status    TSH 04/16/2018 1.88  0.27 - 4.20 uIU/mL Final        DRUG/FOOD ALLERGIES: Patient has no known allergies. CURRENT MEDICATIONS  Prior to Admission medications    Medication Sig Start Date End Date Taking?  Authorizing Provider   levothyroxine (SYNTHROID) 200 MCG tablet TAKE 1 TABLET BY MOUTH EVERY DAY 8/11/21   Waldemar Washburn DO   omeprazole (PRILOSEC) 40 MG delayed release capsule TAKE 1 CAPSULE BY MOUTH 30 MINUTES PRIOR TO EVENING MEAL 7/13/21   Juancho Rdz MD   PARoxetine (PAXIL) 30 MG tablet TAKE 1 TABLET BY MOUTH EVERY DAY 6/13/21   Waldemar Washburn DO   fluticasone (FLONASE) 50 MCG/ACT nasal spray 2 sprays by Nasal route 2 times daily 1/27/21   Juancho Rdz MD   Pseudoephedrine-guaiFENesin 120-1200 MG TB12 Take 1 tablet by mouth 2 times daily as needed (Congestion) 12/10/20   Domi Butler, DO   levothyroxine (SYNTHROID) 200 MCG tablet TAKE 1 TABLET BY MOUTH EVERY DAY 10/11/20   Waldemar Washburn DO   methylphenidate (RITALIN) 5 MG tablet TAKE 1 TABLET BY MOUTH TWO TIMES A DAY 5/15/19   Historical Provider, MD   levothyroxine (SYNTHROID) 200 MCG tablet TAKE 1 TABLET DAILY 5/8/19   Waldemar Washburn DO   PARoxetine (PAXIL) 30 MG tablet TAKE 1 TABLET IN THE       MORNING 3/1/19   Waldemar Washburn DO   guaiFENesin 400 MG tablet Take 400 mg by mouth 4 times daily as needed for Cough    Historical Provider, MD   BuPROPion HCl (WELLBUTRIN PO) Take by mouth    Historical Provider, MD       REVIEW OF SYSTEMS  The following systems were reviewed and revealed the following in addition to any already discussed in the HPI:    CONSTITUTIONAL: no weight loss, no fever, no night sweats, no chills  EYES: no vision changes, no blurry vision  EARS: no changes in hearing, no otalgia  NOSE: no epistaxis, no rhinorrhea  RESPIRATORY: no  Difficulty breathing, no shortness of breath  CV: no chest pain, no Peripheral vascular disease  HEME: No coagulation disorder, no Bleeding disorder  NEURO: no TIA or stroke-like symptoms  SKIN: No new rashes in the head and neck, no recent skin cancers  MOUTH: No new ulcers, no recent teeth infections  GASTROINTESTINAL: No diarrhea, stomach pain  PSYCH: No anxiety, no depression      PHYSICAL EXAM  Ht 6' (1.829 m)   Wt 257 lb (116.6 kg)   BMI 34.86 kg/m²     GENERAL: No acute distress, alert and oriented, no hoarseness, strong voice  EYES: EOMI, Anti-icteric  HENT:   Head: Normocephalic and atraumatic. Face:  Symmetric, facial nerve intact, no sinus tenderness  Right Ear: Normal external ear, normal external auditory canal, intact tympanic membrane with normal mobility and aerated middle ear  Left Ear: Normal external ear, normal external auditory canal, intact tympanic membrane with normal mobility and aerated middle ear  Mouth/Oral Cavity:  normal lips, Uvula is midline, no mucosal lesions, no trismus, normal dentition, normal salivary quality/flow  Oropharynx/Larynx:  normal oropharynx, normal tonsils; patient did not tolerate mirror exam due to excessive gag reflex  Nose:Normal external nasal appearance. Anterior rhinoscopy shows  a deviated septum preventing view posteriorly. Hypertrophy turbinates. Normal mucosa   NECK: Normal range of motion, no thyromegaly, trachea is midline, no lymphadenopathy, no neck masses, no crepitus  CHEST: Normal respiratory effort, no retractions, breathing comfortably  SKIN: No rashes, normal appearing skin, no evidence of skin lesions/tumors  Neuro:  cranial nerve II-XII intact; normal gait  Cardio:  no edema        PROCEDURE  Nasal Endoscopy (CPT code 99327) from prior visit    Preop: chronic rhinitis  Postop: Same    Verbal consent was received.  After topical anesthesia and decongestion had been obtained using aerosolized 1% lidocaine and oxymetazoline, a 45 degree rigid endoscope was placed into both nares with the patient in a sitting position. The following was observed:    Right Nasal Cavity and Paranasal Sinuses:  Polyp score = 0 (0 = no polyps, 1 = small polyps in middle meatus not reaching below the inferior border of the middle chery, 2 = polyps reaching below the middle border of the middle turbinate, 3= large polyps reaching the lower border of the inferior turbinate or polyps medial to the middle chery, 4= large polyps causing almost complete congestion/obstruction of the interior meatus)  Edema score = 2 (0 = absent, 1 = mild, 2 = severe)  Discharge score = 1 (0 = no discharge, 1 = clear thin discharge, 2 = thick purulent discharge)    Left Nasal Cavity and Paranasal Sinuses:    Polyp score = 0 (0 = no polyps, 1 = small polyps in middle meatus not reaching below the inferior border of the middle chery, 2 = polyps reaching below the middle border of the middle turbinate, 3= large polyps reaching the lower border of the inferior turbinate or polyps medial to the middle chery, 4= large polyps causing almost complete congestion/obstruction of the interior meatus)  Edema score = 2 (0 = absent, 1 = mild, 2 = severe)  Discharge score = 1 (0 = no discharge, 1 = clear thin discharge, 2 = thick purulent discharge)    Septum: intact and deviated   Other:   -The inferior and middle turbinates were examined. The middle meatus, and sphenoethmoid recess was examined bilaterally.    -There is a high right sided septal deviation and a left-sided low maxillary crest deviation. There is bilateral inferior turbinate hypertrophy. Overall her sinonasal inflammation is moderately improved. -There were no complications. Tolerated well without complication. I attest that I was present for and did the entire procedure myself. ASSESSMENT/PLAN  1. Nasal obstruction      2. Deviated nasal septum      3.

## 2021-10-12 RX ORDER — OMEPRAZOLE 40 MG/1
CAPSULE, DELAYED RELEASE ORAL
Qty: 30 CAPSULE | Refills: 1 | Status: SHIPPED | OUTPATIENT
Start: 2021-10-12 | End: 2021-12-01 | Stop reason: SDUPTHER

## 2021-11-09 RX ORDER — LEVOTHYROXINE SODIUM 0.2 MG/1
TABLET ORAL
Qty: 90 TABLET | Refills: 0 | Status: SHIPPED | OUTPATIENT
Start: 2021-11-09 | End: 2022-02-06

## 2021-11-16 ENCOUNTER — PATIENT MESSAGE (OUTPATIENT)
Dept: FAMILY MEDICINE CLINIC | Age: 41
End: 2021-11-16

## 2021-11-16 NOTE — TELEPHONE ENCOUNTER
From: Dana Hightower  To: Dr. Yunior Reyes  Sent: 11/16/2021 1:48 PM EST  Subject: Prescription Question    Hello! Back on 3/20/2021 I sent a message to Dr. Nicholas Benito regarding prescriptions that were previously managed by my psychiatrist who is now retired. Dr. Nicholas Benito sent in a script for Paxil (paroxetine generic). I am now out of my bupropion and I thought that Dr. Nicholas Benito was now managing this too. Do you need me to schedule an appointment or evisit for this? I need it as soon as possible. I apologize for the delay, but I thought we had already discussed this. I found out when I went to refill it that the pharmacy still has my former psychiatrist as the managing physician.

## 2021-11-17 RX ORDER — BUPROPION HYDROCHLORIDE 200 MG/1
200 TABLET, EXTENDED RELEASE ORAL DAILY
Qty: 90 TABLET | Refills: 1 | Status: SHIPPED | OUTPATIENT
Start: 2021-11-17 | End: 2022-06-29

## 2021-12-01 RX ORDER — OMEPRAZOLE 40 MG/1
CAPSULE, DELAYED RELEASE ORAL
Qty: 90 CAPSULE | Refills: 1 | Status: SHIPPED | OUTPATIENT
Start: 2021-12-01 | End: 2022-02-08 | Stop reason: ALTCHOICE

## 2022-02-06 RX ORDER — LEVOTHYROXINE SODIUM 0.2 MG/1
TABLET ORAL
Qty: 90 TABLET | Refills: 0 | Status: SHIPPED | OUTPATIENT
Start: 2022-02-06 | End: 2022-05-20

## 2022-02-08 ENCOUNTER — OFFICE VISIT (OUTPATIENT)
Dept: FAMILY MEDICINE CLINIC | Age: 42
End: 2022-02-08
Payer: COMMERCIAL

## 2022-02-08 VITALS
HEIGHT: 72 IN | HEART RATE: 80 BPM | BODY MASS INDEX: 35.21 KG/M2 | OXYGEN SATURATION: 97 % | DIASTOLIC BLOOD PRESSURE: 60 MMHG | WEIGHT: 260 LBS | SYSTOLIC BLOOD PRESSURE: 100 MMHG

## 2022-02-08 DIAGNOSIS — Z31.69 ENCOUNTER FOR PRECONCEPTION CONSULTATION: ICD-10-CM

## 2022-02-08 DIAGNOSIS — E03.9 HYPOTHYROIDISM, UNSPECIFIED TYPE: Primary | ICD-10-CM

## 2022-02-08 DIAGNOSIS — E03.9 HYPOTHYROIDISM, UNSPECIFIED TYPE: ICD-10-CM

## 2022-02-08 LAB
ANTI-THYROGLOB ABS: 582 IU/ML
THYROID PEROXIDASE (TPO) ABS: 143 IU/ML
TSH SERPL DL<=0.05 MIU/L-ACNC: 2.14 UIU/ML (ref 0.27–4.2)

## 2022-02-08 PROCEDURE — 99215 OFFICE O/P EST HI 40 MIN: CPT | Performed by: FAMILY MEDICINE

## 2022-02-08 RX ORDER — LORATADINE 10 MG/1
10 TABLET ORAL DAILY
COMMUNITY
End: 2022-10-31

## 2022-02-08 NOTE — PROGRESS NOTES
2022    Blood pressure 100/60, pulse 80, height 6' (1.829 m), weight 260 lb (117.9 kg), SpO2 97 %, not currently breastfeeding. Kimberly Brumfield (:  1980) is a 39 y.o. female, here for evaluation of the following medical concerns:    Chief Complaint   Patient presents with    New Patient     get establishe for fertility/napro     Here with , Anastasiya Gaspar,  10/21. Pt of Dr Laly Boyd. Hx hypothyroid. Synthroid dose is 200. Last TSH was . Takes on on empty stomach. Lab Results   Component Value Date    TSH 2.14 2022    TSH 1.88 2018    TSH 5.48 2018        She and Anastasiya Gaspar are seeking pregnancy. Has researched [employer] and is interested in more info. She has an IUD that she needs to have removed. Placed by Dr Madie Fairbanks about 2 yrs ago.  (19). Had normal regular cycles prior. No dysmenorrhea  No PMS (mild moodiness is recalled perhaps)  No dyspareunia. Nonsmoker  Alcohol use: 2 drinks per week. Anastasiya Gaspar is 39, has no medical issues. No meds used. Nonsmoker. No abnormal paps  No prior abd surgery. Patient Active Problem List   Diagnosis    Pain in thoracic spine    Lumbago    Conductive hearing loss in right ear    Condctv hear loss, uni, right ear, w unrestr hear cntra side    ETD (Eustachian tube dysfunction), right    Hypothyroidism due to Hashimoto's thyroiditis        Body mass index is 35.26 kg/m². Wt Readings from Last 3 Encounters:   22 260 lb (117.9 kg)   21 257 lb (116.6 kg)   21 259 lb 12.8 oz (117.8 kg)       BP Readings from Last 3 Encounters:   22 100/60   21 124/82   21 80/62       No Known Allergies    Prior to Visit Medications    Medication Sig Taking?  Authorizing Provider   loratadine (CLARITIN) 10 MG tablet Take 10 mg by mouth daily Yes Historical Provider, MD   levothyroxine (SYNTHROID) 200 MCG tablet TAKE 1 TABLET BY MOUTH EVERY DAY Yes Waldemar Washburn, DO   buPROPion (WELLBUTRIN SR) 200 MG extended release tablet Take 1 tablet by mouth daily Yes Waldemar Washburn DO   PARoxetine (PAXIL) 30 MG tablet TAKE 1 TABLET BY MOUTH EVERY DAY Yes Waldemar Washburn DO   fluticasone (FLONASE) 50 MCG/ACT nasal spray 2 sprays by Nasal route 2 times daily Yes Chandni Flores MD   Pseudoephedrine-guaiFENesin 120-1200 MG TB12 Take 1 tablet by mouth 2 times daily as needed (Congestion)  Patient not taking: Reported on 2/8/2022  Clarence Zheng DO   guaiFENesin 400 MG tablet Take 400 mg by mouth 4 times daily as needed for Cough  Patient not taking: Reported on 2/8/2022  Historical Provider, MD        Social History     Tobacco Use    Smoking status: Never Smoker    Smokeless tobacco: Never Used    Tobacco comment: encouraged to never smoke    Substance Use Topics    Alcohol use: Yes     Alcohol/week: 0.0 standard drinks     Comment: occassionally maybe 2 times a week    Drug use: No       Review of Systems   All other systems reviewed and are negative. Physical Exam  Vitals and nursing note reviewed. Constitutional:       General: She is not in acute distress. Appearance: Normal appearance. She is well-developed. She is not ill-appearing, toxic-appearing or diaphoretic. HENT:      Head: Normocephalic and atraumatic. Right Ear: Tympanic membrane, ear canal and external ear normal. There is no impacted cerumen. Left Ear: Tympanic membrane, ear canal and external ear normal. There is no impacted cerumen. Nose: Nose normal. No congestion or rhinorrhea. Mouth/Throat:      Mouth: Mucous membranes are moist.      Pharynx: Oropharynx is clear. No oropharyngeal exudate or posterior oropharyngeal erythema. Eyes:      General:         Right eye: No discharge. Left eye: No discharge. Conjunctiva/sclera: Conjunctivae normal.      Pupils: Pupils are equal, round, and reactive to light. Neck:      Thyroid: No thyromegaly. Trachea: No tracheal deviation. Cardiovascular:      Rate and Rhythm: Normal rate and regular rhythm. Heart sounds: Normal heart sounds. No murmur heard. No friction rub. No gallop. Pulmonary:      Effort: Pulmonary effort is normal. No respiratory distress. Breath sounds: Normal breath sounds. No wheezing or rales. Chest:      Chest wall: No tenderness. Breasts:      Right: No supraclavicular adenopathy. Left: No supraclavicular adenopathy. Musculoskeletal:         General: Normal range of motion. Cervical back: Normal range of motion and neck supple. Lymphadenopathy:      Cervical: No cervical adenopathy. Upper Body:      Right upper body: No supraclavicular adenopathy. Left upper body: No supraclavicular adenopathy. Skin:     General: Skin is warm and dry. Findings: No rash. Neurological:      General: No focal deficit present. Mental Status: She is alert and oriented to person, place, and time. Mental status is at baseline. Psychiatric:         Mood and Affect: Mood normal.         Behavior: Behavior normal.         Thought Content: Thought content normal.         Judgment: Judgment normal.         ASSESSMENT/PLAN:    1. Hypothyroidism, unspecified type  - discussed hypothyroid in context of fertility. TSH goal <2.5 (<2.0 if autoimmune). - TSH without Reflex; Future  - THYROID PEROXIDASE ANTIBODY; Future  - ANTI-THYROGLOBULIN ANTIBODY; Future    2. Encounter for preconception consultation  - discussed [employer] and usual fertility evaluation. - first step is to have IUD removed, which she will accomplish with her OB  - start PNV  - plan to learn charting, identify fertile window for focused intercourse, meet back after 2 charted cycles to review and plan for further testing if indicated. (addendum: after visit I dennys that her anti-depression meds include paroxetine; this would be best to transition to lexapro or zoloft in anticipation of conception.   Will do this at next visit)      F/u after 2 charted cycles. Seen along with GREG Cavazos     Total time spent on this encounter: 45 min    An  electronicsignature was used to authenticate this note.     --Marie Tello MD on 2/9/2022 at 1:49 PM

## 2022-02-08 NOTE — PATIENT INSTRUCTIONS
[employer]   Contact Vonna Jeans to begin charting your menstrual cycles ([phone]). Email - [email or web address]  Follow up after 2 charted cycles.

## 2022-02-09 PROBLEM — E06.3 HYPOTHYROIDISM DUE TO HASHIMOTO'S THYROIDITIS: Status: ACTIVE | Noted: 2022-02-08

## 2022-02-09 PROBLEM — E03.8 HYPOTHYROIDISM DUE TO HASHIMOTO'S THYROIDITIS: Status: ACTIVE | Noted: 2022-02-08

## 2022-02-13 RX ORDER — PAROXETINE 30 MG/1
TABLET, FILM COATED ORAL
Qty: 90 TABLET | Refills: 1 | Status: SHIPPED | OUTPATIENT
Start: 2022-02-13 | End: 2022-09-27

## 2022-03-15 ENCOUNTER — TELEPHONE (OUTPATIENT)
Dept: FAMILY MEDICINE CLINIC | Age: 42
End: 2022-03-15

## 2022-03-15 NOTE — TELEPHONE ENCOUNTER
Patient called stating she has had a sore throat, itchy ears  and chest congestion. Symptoms started last Tuesday.  Has tired Mucinex D that helped with mucus but nothing else

## 2022-03-16 ENCOUNTER — OFFICE VISIT (OUTPATIENT)
Dept: FAMILY MEDICINE CLINIC | Age: 42
End: 2022-03-16
Payer: COMMERCIAL

## 2022-03-16 DIAGNOSIS — J02.0 STREP THROAT: Primary | ICD-10-CM

## 2022-03-16 PROCEDURE — 99213 OFFICE O/P EST LOW 20 MIN: CPT | Performed by: FAMILY MEDICINE

## 2022-03-16 RX ORDER — AMOXICILLIN 500 MG/1
500 CAPSULE ORAL 3 TIMES DAILY
Qty: 21 CAPSULE | Refills: 0 | Status: SHIPPED | OUTPATIENT
Start: 2022-03-16 | End: 2022-03-23

## 2022-03-16 ASSESSMENT — ENCOUNTER SYMPTOMS
TROUBLE SWALLOWING: 1
SORE THROAT: 1

## 2022-03-16 NOTE — PROGRESS NOTES
Left message for Candy to call. Zainab Estrada (:  1980) is a Established patient, here for evaluation of the following:    Assessment & Plan   Below is the assessment and plan developed based on review of pertinent history, physical exam, labs, studies, and medications. 1. Strep throat  amoxil  Call if not feeling better in the next few days  Return if symptoms worsen or fail to improve. Subjective   Pharyngitis  This is a new problem. The current episode started in the past 7 days. The problem occurs constantly. The problem has been gradually worsening. Associated symptoms include fatigue, headaches and a sore throat. The symptoms are aggravated by drinking, eating and swallowing. She has tried acetaminophen for the symptoms. The treatment provided no relief. exposed to strep  Review of Systems   Constitutional: Positive for activity change and fatigue. HENT: Positive for sore throat and trouble swallowing. Neurological: Positive for headaches. Objective   Patient-Reported Vitals  No data recorded     Physical Exam  Constitutional:       General: She is not in acute distress. Appearance: She is well-developed. She is ill-appearing. HENT:      Head: Normocephalic. Neurological:      Mental Status: She is alert and oriented to person, place, and time. Psychiatric:         Behavior: Behavior normal.         Thought Content: Thought content normal.         Judgment: Judgment normal.         Zainab Estrada, was evaluated through a synchronous (real-time) audio-video encounter. The patient (or guardian if applicable) is aware that this is a billable service, which includes applicable co-pays. This Virtual Visit was conducted with patient's (and/or legal guardian's) consent. The visit was conducted pursuant to the emergency declaration under the [address] [place] [address] authority and the [employer] and [employer] General Act.   Patient identification was verified, and a caregiver was present when appropriate. The patient was located at home in a state where the provider was licensed to provide care.        --Manasa Muhammad, DO

## 2022-05-20 RX ORDER — LEVOTHYROXINE SODIUM 0.2 MG/1
TABLET ORAL
Qty: 90 TABLET | Refills: 0 | Status: SHIPPED | OUTPATIENT
Start: 2022-05-20 | End: 2022-08-26

## 2022-06-29 RX ORDER — BUPROPION HYDROCHLORIDE 200 MG/1
TABLET, EXTENDED RELEASE ORAL
Qty: 90 TABLET | Refills: 1 | Status: SHIPPED | OUTPATIENT
Start: 2022-06-29

## 2022-08-02 ENCOUNTER — OFFICE VISIT (OUTPATIENT)
Dept: FAMILY MEDICINE CLINIC | Age: 42
End: 2022-08-02
Payer: COMMERCIAL

## 2022-08-02 VITALS
HEIGHT: 72 IN | BODY MASS INDEX: 37.19 KG/M2 | DIASTOLIC BLOOD PRESSURE: 72 MMHG | WEIGHT: 274.6 LBS | SYSTOLIC BLOOD PRESSURE: 126 MMHG

## 2022-08-02 DIAGNOSIS — M50.30 DDD (DEGENERATIVE DISC DISEASE), CERVICAL: Primary | ICD-10-CM

## 2022-08-02 PROCEDURE — 99213 OFFICE O/P EST LOW 20 MIN: CPT | Performed by: FAMILY MEDICINE

## 2022-08-02 RX ORDER — TIZANIDINE 4 MG/1
4 TABLET ORAL 3 TIMES DAILY PRN
Qty: 30 TABLET | Refills: 0 | Status: SHIPPED | OUTPATIENT
Start: 2022-08-02 | End: 2022-10-31

## 2022-08-02 RX ORDER — MELOXICAM 15 MG/1
15 TABLET ORAL DAILY
Qty: 30 TABLET | Refills: 3 | Status: SHIPPED | OUTPATIENT
Start: 2022-08-02

## 2022-08-02 RX ORDER — PAROXETINE HYDROCHLORIDE 20 MG/1
20 TABLET, FILM COATED ORAL DAILY
Qty: 30 TABLET | Refills: 0 | Status: SHIPPED | OUTPATIENT
Start: 2022-08-02 | End: 2022-09-27

## 2022-08-02 ASSESSMENT — PATIENT HEALTH QUESTIONNAIRE - PHQ9
SUM OF ALL RESPONSES TO PHQ QUESTIONS 1-9: 0
SUM OF ALL RESPONSES TO PHQ QUESTIONS 1-9: 0
1. LITTLE INTEREST OR PLEASURE IN DOING THINGS: 0
SUM OF ALL RESPONSES TO PHQ9 QUESTIONS 1 & 2: 0
2. FEELING DOWN, DEPRESSED OR HOPELESS: 0
SUM OF ALL RESPONSES TO PHQ QUESTIONS 1-9: 0
SUM OF ALL RESPONSES TO PHQ QUESTIONS 1-9: 0

## 2022-08-02 ASSESSMENT — ENCOUNTER SYMPTOMS
GASTROINTESTINAL NEGATIVE: 1
RESPIRATORY NEGATIVE: 1

## 2022-08-03 NOTE — PROGRESS NOTES
Marie Richter (:  1980) is a 39 y.o. female,Established patient, here for evaluation of the following chief complaint(s):  Neck Pain (Neck pain/stiff neck into jaw/)         ASSESSMENT/PLAN:  1. DDD (degenerative disc disease), cervical  -     XR CERVICAL SPINE (4-5 VIEWS); Future  Await xrays  Stretching exercises   Muscle relaxant and nsaid    No follow-ups on file. Subjective   SUBJECTIVE/OBJECTIVE:  Neck Pain       Review of Systems   Constitutional: Negative. HENT: Negative. Respiratory: Negative. Cardiovascular: Negative. Gastrointestinal: Negative. Musculoskeletal:  Positive for myalgias, neck pain and neck stiffness. Neurological: Negative. Psychiatric/Behavioral:  Positive for decreased concentration. Objective   Physical Exam  Constitutional:       General: She is not in acute distress. Appearance: She is well-developed. HENT:      Head: Normocephalic. Neck:      Comments: Decreased flex and ext   decreased side bending   Neurological:      Mental Status: She is alert and oriented to person, place, and time. Psychiatric:         Behavior: Behavior normal.         Thought Content: Thought content normal.         Judgment: Judgment normal.                An electronic signature was used to authenticate this note.     --Maxwell Thomas DO

## 2022-08-17 ENCOUNTER — PATIENT MESSAGE (OUTPATIENT)
Dept: FAMILY MEDICINE CLINIC | Age: 42
End: 2022-08-17

## 2022-08-17 DIAGNOSIS — M50.30 DDD (DEGENERATIVE DISC DISEASE), CERVICAL: Primary | ICD-10-CM

## 2022-08-17 NOTE — TELEPHONE ENCOUNTER
From: [facility]  To: Dr. Ashlyn Porter  Sent: 8/17/2022 8:52 AM EDT  Subject: Question regarding XR Cervical Spine    Would the MRI just give more information? What type of treatment might be necessary to improve my condition?

## 2022-08-26 RX ORDER — LEVOTHYROXINE SODIUM 0.2 MG/1
TABLET ORAL
Qty: 90 TABLET | Refills: 0 | Status: SHIPPED | OUTPATIENT
Start: 2022-08-26

## 2022-08-29 ENCOUNTER — PATIENT MESSAGE (OUTPATIENT)
Dept: FAMILY MEDICINE CLINIC | Age: 42
End: 2022-08-29

## 2022-08-29 DIAGNOSIS — M50.30 DEGENERATIVE DISC DISEASE, CERVICAL: Primary | ICD-10-CM

## 2022-08-29 NOTE — TELEPHONE ENCOUNTER
From: Thai Barroso  To: Dr. Laure David  Sent: 8/29/2022 4:18 PM EDT  Subject: MRI Results    Hi Dr. Rachel Musa- I really don't have any noticeable pain in my right arm. On a scale of 1-10 for pain, I'd say my pain is not noticeable when I'm still, but when I turn my neck left it's about a 4.5, turn my neck right it's about a 5, look up it's about 3, look down, about 3.

## 2022-09-27 RX ORDER — PAROXETINE HYDROCHLORIDE 20 MG/1
TABLET, FILM COATED ORAL
Qty: 30 TABLET | Refills: 0 | Status: SHIPPED | OUTPATIENT
Start: 2022-09-27 | End: 2022-10-31

## 2022-10-03 RX ORDER — BUPROPION HYDROCHLORIDE 100 MG/1
100 TABLET, EXTENDED RELEASE ORAL DAILY
Qty: 21 TABLET | Refills: 3 | Status: SHIPPED | OUTPATIENT
Start: 2022-10-03 | End: 2022-10-31

## 2022-10-31 ENCOUNTER — OFFICE VISIT (OUTPATIENT)
Dept: FAMILY MEDICINE CLINIC | Age: 42
End: 2022-10-31
Payer: COMMERCIAL

## 2022-10-31 VITALS
DIASTOLIC BLOOD PRESSURE: 78 MMHG | WEIGHT: 270 LBS | HEART RATE: 86 BPM | BODY MASS INDEX: 36.62 KG/M2 | RESPIRATION RATE: 12 BRPM | SYSTOLIC BLOOD PRESSURE: 110 MMHG | OXYGEN SATURATION: 98 %

## 2022-10-31 DIAGNOSIS — E06.3 HYPOTHYROIDISM DUE TO HASHIMOTO'S THYROIDITIS: ICD-10-CM

## 2022-10-31 DIAGNOSIS — Z31.69 PRE-CONCEPTION COUNSELING: ICD-10-CM

## 2022-10-31 DIAGNOSIS — E03.8 HYPOTHYROIDISM DUE TO HASHIMOTO'S THYROIDITIS: ICD-10-CM

## 2022-10-31 DIAGNOSIS — M54.6 PAIN IN THORACIC SPINE: ICD-10-CM

## 2022-10-31 DIAGNOSIS — N94.3 PMS (PREMENSTRUAL SYNDROME): Primary | ICD-10-CM

## 2022-10-31 PROCEDURE — 99214 OFFICE O/P EST MOD 30 MIN: CPT | Performed by: FAMILY MEDICINE

## 2022-10-31 NOTE — PROGRESS NOTES
10/31/2022    Blood pressure 110/78, pulse 86, resp. rate 12, weight 270 lb (122.5 kg), SpO2 98 %, not currently breastfeeding. Juli Taylor (:  1980) is a 43 y.o. female, here for evaluation of the following medical concerns:    Chief Complaint   Patient presents with    Hypothyroidism     F/u fertility     Here for routine follow up of primary infertility. Last met   She had IUD removed later in 2022. Avoided pregnancy until   [place] [facility]. Has charting going back to . Regular cycles (usually 28 days)    Limited mucus, but started vit B6 500mg. Ovulations around cycle day 14-17. LMP 10/8. Peak day in current cycle likely 10/22 (today peak plus 9). So despite her advanced maternal age, she has never sought conception until 2022. She has recently weaned off paroxetine and continues on Wellbutrin (current dose 100, plans increase to 200mg). PMS: significant breast tenderness for week preceding menses, or longer. Increase in irritability and tearfulness also. Denies dyspareunia or dysmenorrhea. Last pap normal  (7 hills)     Flavia Saleem is 43 and has never fathered a child. No PMH or med use. Synthroid dose is 200. TSH 2.14 22    Using Mobic for neck and trapezius pain that has proved chronic. Is doing PT currently. She has not started PNV, but is taking a 'women's vitamin'      Patient Active Problem List   Diagnosis    Pain in thoracic spine    Lumbago    Conductive hearing loss in right ear    Condctv hear loss, uni, right ear, w unrestr hear cntra side    ETD (Eustachian tube dysfunction), right    Hypothyroidism due to Hashimoto's thyroiditis        Body mass index is 36.62 kg/m².     Wt Readings from Last 3 Encounters:   10/31/22 270 lb (122.5 kg)   22 274 lb 9.6 oz (124.6 kg)   22 260 lb (117.9 kg)       BP Readings from Last 3 Encounters:   10/31/22 110/78   22 126/72   22 100/60       No Known Writer received referral, reviewed for appropriate plan, and sent to New Patient Scheduling for completion.     Allergies    Prior to Visit Medications    Medication Sig Taking? Authorizing Provider   levothyroxine (SYNTHROID) 200 MCG tablet TAKE 1 TABLET BY MOUTH EVERY DAY Yes Waldemar Washburn, DO   meloxicam (MOBIC) 15 MG tablet Take 1 tablet by mouth in the morning. Yes Waldemar Washburn, DO   buPROPion (WELLBUTRIN SR) 200 MG extended release tablet TAKE 1 TABLET BY MOUTH EVERY DAY Yes Waldemar Washburn, DO   fluticasone (FLONASE) 50 MCG/ACT nasal spray 2 sprays by Nasal route 2 times daily Yes Pato Salazar MD   loratadine (CLARITIN) 10 MG tablet Take 10 mg by mouth daily  Patient not taking: Reported on 10/31/2022  Historical Provider, MD   guaiFENesin 400 MG tablet Take 400 mg by mouth 4 times daily as needed for Cough  Patient not taking: No sig reported  Historical Provider, MD        Social History     Tobacco Use    Smoking status: Never    Smokeless tobacco: Never    Tobacco comments:     encouraged to never smoke    Substance Use Topics    Alcohol use: Yes     Alcohol/week: 0.0 standard drinks     Comment: occassionally maybe 2 times a week    Drug use: No       Review of Systems As above     Physical Exam  Constitutional:       General: She is not in acute distress. Appearance: Normal appearance. She is not ill-appearing. Pulmonary:      Effort: Pulmonary effort is normal.   Neurological:      General: No focal deficit present. Mental Status: She is alert and oriented to person, place, and time. Mental status is at baseline. Psychiatric:         Mood and Affect: Mood normal.         Behavior: Behavior normal.         Thought Content: Thought content normal.         Judgment: Judgment normal.       ASSESSMENT/PLAN:    1. PMS (premenstrual syndrome)  - symptoms not significant enough to seek treatment for its own sake right now.   We discussed that if she does not conceive after 3 more cycles, I would recommend luteal hormone profile to assess ovarian function (progesterone and estradiol levels peak plus 3,5,7,9 and 11); PMS is often associated with luteal defect and it's treatment often improves PMS symptoms. 2. Pre-conception counseling  - OK to seek conception without further testing for at lest 3 m ore cycles. Call if +. Wellbutrin is considered reasonably safe in pregnancy if she wishes to continue. But often it does not control anxious symptoms well. Encouraged to discussed SSRI therapy with her PCP if anxiety becomes too disruptive off paxil. Zoloft and Lexapro have the best track record with use in pregnancy without evidence of risk to the baby. - start PNV daily (OTC is OK) or add extra 800 mcg of folate daily as a supplement. 3. Pain in thoracic spine  - advised to not use NSAIDs in luteal phase. Stop when approaching peak day each cycle- can change to tylenol. OK to use NSAIDs in follicular phase, however. 4. Hypothyroidism due to Hashimoto's thyroiditis  - TSH not yet to goal (<2) on current dose of synthroid. Retest with next set of labs or her first opportunity. Continue and test q 6 mo, or upon confirmation of pregnancy. We follow this in pregnancy also, monthly for the first half of pregnancy. Return in about 3 months (around 1/31/2023) for follow up fertility. Total time spent on this encounter: 30 min    An  [employer] Pharmaceuticalsignature was used to authenticate this note.     --Abdulkadir Prieto MD on 10/31/2022 at 10:14 AM

## 2022-11-06 RX ORDER — BUPROPION HYDROCHLORIDE 100 MG/1
100 TABLET, EXTENDED RELEASE ORAL DAILY
Qty: 21 TABLET | Refills: 3 | Status: SHIPPED | OUTPATIENT
Start: 2022-11-06

## 2022-11-14 RX ORDER — PAROXETINE HYDROCHLORIDE 20 MG/1
TABLET, FILM COATED ORAL
Qty: 30 TABLET | Refills: 2 | Status: SHIPPED | OUTPATIENT
Start: 2022-11-14

## 2022-11-14 NOTE — TELEPHONE ENCOUNTER
Last office visit 8/2/2022     Last written      Next office visit scheduled Visit date not found    Requested Prescriptions     Pending Prescriptions Disp Refills    PARoxetine (PAXIL) 20 MG tablet [Pharmacy Med Name: PAROXETINE HCL 20 MG TABLET] 30 tablet 0     Sig: TAKE 1 TABLET BY MOUTH EVERY DAY IN THE MORNING

## 2022-11-15 ENCOUNTER — TELEPHONE (OUTPATIENT)
Dept: FAMILY MEDICINE CLINIC | Age: 42
End: 2022-11-15

## 2022-11-15 NOTE — TELEPHONE ENCOUNTER
Patient calling due to increased anxiety and depression. She has totally tapered off of the Paxil (last dose 11/2/22.)       However there seems to be confusion regarding the Wellbutrin. Patient said she is taking 200 mg qd (June 2022 rx), however directions for 11/6 rx  Wellbutrin 100 mg was to be :  TAKE 1 TABLET BY MOUTH DAILY FOR TWO WEEKS THEN ONE EVERY OTHER DAY FOR TWO WEEKS THEN STOP. Please advise what she should do.

## 2022-11-15 NOTE — TELEPHONE ENCOUNTER
Left VM for patient informing her of Dr. Carlotta Sánchez message. Advised her to call back with any questions or concerns.

## 2022-11-16 ENCOUNTER — TELEMEDICINE (OUTPATIENT)
Dept: FAMILY MEDICINE CLINIC | Age: 42
End: 2022-11-16
Payer: COMMERCIAL

## 2022-11-16 DIAGNOSIS — F41.1 GAD (GENERALIZED ANXIETY DISORDER): Primary | ICD-10-CM

## 2022-11-16 PROCEDURE — 99213 OFFICE O/P EST LOW 20 MIN: CPT | Performed by: FAMILY MEDICINE

## 2022-11-16 ASSESSMENT — ENCOUNTER SYMPTOMS
GASTROINTESTINAL NEGATIVE: 1
RESPIRATORY NEGATIVE: 1

## 2022-11-16 NOTE — PROGRESS NOTES
Haley Marquez (:  1980) is a Established patient, here for evaluation of the following:    Assessment & Plan   Below is the assessment and plan developed based on review of pertinent history, physical exam, labs, studies, and medications. 1. STACIE (generalized anxiety disorder)  Add zoloft 50 mg  Will follow up in one month   if doing well will try to wean down on the wellbutrin and increase zoloft   best for pregnancy  Return if symptoms worsen or fail to improve. Subjective   HPI  Anxiety  She is weaned off the paxil  still taking wellbutrin 200  She is close to tears frequently  Wants to discuss medication changes  currently hoping to get pregnant   Not suicidal       Review of Systems   Constitutional: Negative. HENT: Negative. Respiratory: Negative. Cardiovascular: Negative. Gastrointestinal: Negative. Neurological: Negative. Psychiatric/Behavioral:  Positive for decreased concentration. Objective   Patient-Reported Vitals  No data recorded     Physical Exam  Constitutional:       General: She is not in acute distress. Appearance: She is well-developed. HENT:      Head: Normocephalic. Neurological:      Mental Status: She is alert and oriented to person, place, and time. Psychiatric:         Thought Content: Thought content normal.         Judgment: Judgment normal.      Comments: tearful                Haley Marquez, was evaluated through a synchronous (real-time) audio-video encounter. The patient (or guardian if applicable) is aware that this is a billable service, which includes applicable co-pays. This Virtual Visit was conducted with patient's (and/or legal guardian's) consent. The visit was conducted pursuant to the emergency declaration under the [address] [place] [address] authority and the [facility] and [employer] General Act.   Patient identification was verified, and a caregiver was present when appropriate. The patient was located at Home: [address] [facility] [address].    Provider was located at [facility] ([place] [address]): [address] [facility],  [address],

## 2022-11-30 DIAGNOSIS — E03.8 HYPOTHYROIDISM DUE TO HASHIMOTO'S THYROIDITIS: ICD-10-CM

## 2022-11-30 DIAGNOSIS — E06.3 HYPOTHYROIDISM DUE TO HASHIMOTO'S THYROIDITIS: ICD-10-CM

## 2022-11-30 LAB — TSH SERPL DL<=0.05 MIU/L-ACNC: 1.07 UIU/ML (ref 0.27–4.2)

## 2022-12-02 RX ORDER — LEVOTHYROXINE SODIUM 0.2 MG/1
TABLET ORAL
Qty: 90 TABLET | Refills: 0 | Status: SHIPPED | OUTPATIENT
Start: 2022-12-02

## 2022-12-09 RX ORDER — MELOXICAM 15 MG/1
TABLET ORAL
Qty: 30 TABLET | Refills: 3 | Status: SHIPPED | OUTPATIENT
Start: 2022-12-09

## 2023-02-14 RX ORDER — BUPROPION HYDROCHLORIDE 200 MG/1
TABLET, EXTENDED RELEASE ORAL
Qty: 90 TABLET | Refills: 1 | Status: SHIPPED | OUTPATIENT
Start: 2023-02-14

## 2023-03-05 RX ORDER — LEVOTHYROXINE SODIUM 0.2 MG/1
TABLET ORAL
Qty: 90 TABLET | Refills: 0 | Status: SHIPPED | OUTPATIENT
Start: 2023-03-05

## 2023-03-28 ENCOUNTER — E-VISIT (OUTPATIENT)
Dept: FAMILY MEDICINE CLINIC | Age: 43
End: 2023-03-28
Payer: COMMERCIAL

## 2023-03-28 DIAGNOSIS — J01.90 ACUTE BACTERIAL SINUSITIS: Primary | ICD-10-CM

## 2023-03-28 DIAGNOSIS — B96.89 ACUTE BACTERIAL SINUSITIS: Primary | ICD-10-CM

## 2023-03-28 PROCEDURE — 99422 OL DIG E/M SVC 11-20 MIN: CPT | Performed by: FAMILY MEDICINE

## 2023-03-28 RX ORDER — AZITHROMYCIN 250 MG/1
250 TABLET, FILM COATED ORAL SEE ADMIN INSTRUCTIONS
Qty: 6 TABLET | Refills: 0 | Status: SHIPPED | OUTPATIENT
Start: 2023-03-28 | End: 2023-04-02

## 2023-03-28 ASSESSMENT — LIFESTYLE VARIABLES: SMOKING_STATUS: NO, I'VE NEVER SMOKED

## 2023-03-28 NOTE — PROGRESS NOTES
Kaorlina Turpin (1980) initiated an asynchronous digital communication through [address] [place] [address]. HPI: per patient questionnaire     Exam: not applicable    Diagnoses and all orders for this visit:  Diagnoses and all orders for this visit:    Acute bacterial sinusitis  -     azithromycin (ZITHROMAX) 250 MG tablet; Take 1 tablet by mouth See Admin Instructions for 5 days 500mg on day 1 followed by 250mg on days 2 - 5          Time: EV2 - 11-20 minutes were spent on the digital evaluation and management of this patient.      Aubree Hall, DO

## 2023-06-02 RX ORDER — LEVOTHYROXINE SODIUM 0.2 MG/1
TABLET ORAL
Qty: 90 TABLET | Refills: 0 | Status: SHIPPED | OUTPATIENT
Start: 2023-06-02

## 2023-06-16 ENCOUNTER — TELEPHONE (OUTPATIENT)
Dept: BREAST CENTER | Age: 43
End: 2023-06-16

## 2023-06-16 PROBLEM — H69.91 ETD (EUSTACHIAN TUBE DYSFUNCTION), RIGHT: Status: RESOLVED | Noted: 2018-03-21 | Resolved: 2023-06-16

## 2023-06-16 PROBLEM — H69.81 ETD (EUSTACHIAN TUBE DYSFUNCTION), RIGHT: Status: RESOLVED | Noted: 2018-03-21 | Resolved: 2023-06-16

## 2023-06-16 PROBLEM — H90.11 CONDCTV HEAR LOSS, UNI, RIGHT EAR, W UNRESTR HEAR CNTRA SIDE: Status: RESOLVED | Noted: 2018-03-21 | Resolved: 2023-06-16

## 2023-06-20 ENCOUNTER — TELEPHONE (OUTPATIENT)
Dept: SURGERY | Age: 43
End: 2023-06-20

## 2023-06-20 NOTE — TELEPHONE ENCOUNTER
Breast History:  History of Previous Mammogram 2023, U/S 6/15/23  Family History of Breast Cancer:None  Family History of Other Cancers:brother- chest, (M) cousin cervical  Ashkenazi Buddhism Decent:None  Bra Size:40 C  JACI Risk Assessment _____ %    Gyne History:  : N/A  Para: N/A  Age of Menarche: 15  Age of Menopause: Pre  Age of first live Birth: N/A  History of Hysterectomy / NORA-BSO:N/A  History of OCP's: 1 year on pills, 3 years with an IUD  HRT: None  Family History or personal history of Ovarian Cancer: None    Lifestyle:  Diet:Regular  Smoker ( Current / Former ): None  ETOH ( alcohol consumption ): social   Exercise: yes 2-3 x weekly  Caffeine: 20 oz coffee daily, diet soda 4x weekly  Drug use ( including THC/ Jessica  ): None

## 2023-06-21 ENCOUNTER — OFFICE VISIT (OUTPATIENT)
Dept: BREAST CENTER | Age: 43
End: 2023-06-21
Payer: COMMERCIAL

## 2023-06-21 VITALS
OXYGEN SATURATION: 98 % | HEART RATE: 86 BPM | DIASTOLIC BLOOD PRESSURE: 70 MMHG | SYSTOLIC BLOOD PRESSURE: 110 MMHG | BODY MASS INDEX: 35.94 KG/M2 | RESPIRATION RATE: 16 BRPM | WEIGHT: 265 LBS

## 2023-06-21 DIAGNOSIS — R92.2 DENSE BREASTS: ICD-10-CM

## 2023-06-21 DIAGNOSIS — N63.12 MASS OF UPPER INNER QUADRANT OF RIGHT BREAST: Primary | ICD-10-CM

## 2023-06-21 PROCEDURE — 99203 OFFICE O/P NEW LOW 30 MIN: CPT | Performed by: SURGERY

## 2023-06-21 NOTE — PROGRESS NOTES
2023    Chief Complaint   Patient presents with    Surgical Consult     PCP/ Ref: Dr. Emily Royal, Mass of right breast, no complaints        HPI Patient underwent a recent screening mammogram (Proscan) showing heterogeneously dense breast tissue and a right breast abnormality. Ultrasound of the right breast shows a 1.4 cm mass 3:00, 4 cmfn. Normal axillary nodes. Core biopsy scheduled for tomorrow. She has not felt any breast masses, no breast pain or nipple discharge.      Breast History:  History of Previous Mammogram 2023, U/S 6/15/23  Family History of Breast Cancer:None  Family History of Other Cancers:brother- chest, (M) cousin cervical  Ashkenazi Sabianist Decent:None  Bra Size:40 C     Gyne History:  : N/A  Para: N/A  Age of Menarche: 15  Age of Menopause: Pre  Age of first live Birth: N/A  History of Hysterectomy / NORA-BSO:N/A  History of OCP's: 1 year on pills, 3 years with an IUD  HRT: None  Family History or personal history of Ovarian Cancer: None     Lifestyle:  Diet:Regular  Smoker ( Current / Former ): None  ETOH ( alcohol consumption ): social   Exercise: yes 2-3 x weekly  Caffeine: 20 oz coffee daily, diet soda 4x weekly  Drug use ( including THC/ Seleta Clapper ): None            Current Outpatient Medications:     Prenatal MV-Min-Fe Fum-FA-DHA (PRENATAL 1 PO), Take by mouth, Disp: , Rfl:     levothyroxine (SYNTHROID) 200 MCG tablet, TAKE 1 TABLET BY MOUTH EVERY DAY, Disp: 90 tablet, Rfl: 0    sertraline (ZOLOFT) 50 MG tablet, TAKE 1 TABLET BY MOUTH EVERY DAY, Disp: 30 tablet, Rfl: 1    buPROPion (WELLBUTRIN SR) 200 MG extended release tablet, TAKE 1 TABLET BY MOUTH EVERY DAY, Disp: 90 tablet, Rfl: 1    meloxicam (MOBIC) 15 MG tablet, TAKE 1 TABLET BY MOUTH EVERY DAY IN THE MORNING (Patient not taking: Reported on 2023), Disp: 30 tablet, Rfl: 3    PARoxetine (PAXIL) 20 MG tablet, TAKE 1 TABLET BY MOUTH EVERY DAY IN THE MORNING, Disp: 30 tablet, Rfl: 2    buPROPion (WELLBUTRIN SR) 100 MG

## 2023-06-21 NOTE — TELEPHONE ENCOUNTER
Breast History:  History of Previous Mammogram 2023, U/S 6/15/23  Family History of Breast Cancer:None  Family History of Other Cancers:brother- chest, (M) cousin cervical  Ashkenazi Rastafarian Decent:None  Bra Size:40 C  JACI Risk Assessment 13.7 %  Without breast density known     Gyne History:  : N/A  Para: N/A  Age of Menarche: 15  Age of Menopause: Pre  Age of first live Birth: N/A  History of Hysterectomy / NORA-BSO:N/A  History of OCP's: 1 year on pills, 3 years with an IUD  HRT: None  Family History or personal history of Ovarian Cancer: None     Lifestyle:  Diet:Regular  Smoker ( Current / Former ): None  ETOH ( alcohol consumption ): social   Exercise: yes 2-3 x weekly  Caffeine: 20 oz coffee daily, diet soda 4x weekly  Drug use ( including THC/ [employer] ): None

## 2023-06-21 NOTE — PATIENT INSTRUCTIONS
Mammogram reviewed, no concerning findings, Dense breast tissue  Breast exam performed, no palpable masses. Continue self breast exams    Healthy Lifestyle Recommendations: healthy diet (decrease consumption of red meat, increase fresh fruits and vegetables), decreased alcohol consumption (less than 4 drinks/week), adequate sleep (goal 6-8 hours), routine exercise (goal 150 minutes/week or greater), weight control. Return:U/S Biopsy of R breast, Call office to make follow up appointment for after biopsy.  Patient will have MRI and biopsy done at [facility] AT [facility]

## 2023-06-29 ENCOUNTER — TELEPHONE (OUTPATIENT)
Dept: SURGERY | Age: 43
End: 2023-06-29

## 2023-06-30 ENCOUNTER — TELEPHONE (OUTPATIENT)
Dept: SURGERY | Age: 43
End: 2023-06-30

## 2023-06-30 ENCOUNTER — INITIAL CONSULT (OUTPATIENT)
Dept: SURGERY | Age: 43
End: 2023-06-30
Payer: COMMERCIAL

## 2023-06-30 VITALS
SYSTOLIC BLOOD PRESSURE: 86 MMHG | BODY MASS INDEX: 36.16 KG/M2 | OXYGEN SATURATION: 96 % | HEART RATE: 66 BPM | HEIGHT: 72 IN | DIASTOLIC BLOOD PRESSURE: 60 MMHG | RESPIRATION RATE: 16 BRPM | WEIGHT: 267 LBS

## 2023-06-30 DIAGNOSIS — Z17.0 MALIGNANT NEOPLASM OF UPPER-INNER QUADRANT OF RIGHT BREAST IN FEMALE, ESTROGEN RECEPTOR POSITIVE (HCC): Primary | ICD-10-CM

## 2023-06-30 DIAGNOSIS — R92.2 DENSE BREASTS: ICD-10-CM

## 2023-06-30 DIAGNOSIS — C50.211 MALIGNANT NEOPLASM OF UPPER-INNER QUADRANT OF RIGHT BREAST IN FEMALE, ESTROGEN RECEPTOR POSITIVE (HCC): Primary | ICD-10-CM

## 2023-06-30 PROCEDURE — 99215 OFFICE O/P EST HI 40 MIN: CPT | Performed by: SURGERY

## 2023-07-03 DIAGNOSIS — C50.211 MALIGNANT NEOPLASM OF UPPER-INNER QUADRANT OF RIGHT BREAST IN FEMALE, ESTROGEN RECEPTOR POSITIVE (HCC): Primary | ICD-10-CM

## 2023-07-03 DIAGNOSIS — Z17.0 MALIGNANT NEOPLASM OF UPPER-INNER QUADRANT OF RIGHT BREAST IN FEMALE, ESTROGEN RECEPTOR POSITIVE (HCC): Primary | ICD-10-CM

## 2023-07-12 ENCOUNTER — HOSPITAL ENCOUNTER (OUTPATIENT)
Dept: MRI IMAGING | Age: 43
Discharge: HOME | End: 2023-07-12
Payer: COMMERCIAL

## 2023-07-12 DIAGNOSIS — R92.2 DENSE BREASTS: ICD-10-CM

## 2023-07-12 DIAGNOSIS — N63.12 MASS OF UPPER INNER QUADRANT OF RIGHT BREAST: ICD-10-CM

## 2023-07-12 PROCEDURE — 6360000004 HC RX CONTRAST MEDICATION: Performed by: SURGERY

## 2023-07-12 PROCEDURE — A9579 GAD-BASE MR CONTRAST NOS,1ML: HCPCS | Performed by: SURGERY

## 2023-07-12 PROCEDURE — 2580000003 HC RX 258: Performed by: SURGERY

## 2023-07-12 PROCEDURE — C8908 MRI W/O FOL W/CONT, BREAST,: HCPCS

## 2023-07-12 RX ORDER — SODIUM CHLORIDE 0.9 % (FLUSH) 0.9 %
5-40 SYRINGE (ML) INJECTION 2 TIMES DAILY
Status: DISCONTINUED | OUTPATIENT
Start: 2023-07-12 | End: 2023-07-13 | Stop reason: HOSPADM

## 2023-07-12 RX ADMIN — GADOTERIDOL 20 ML: 279.3 INJECTION, SOLUTION INTRAVENOUS at 11:31

## 2023-07-12 RX ADMIN — SODIUM CHLORIDE, PRESERVATIVE FREE 10 ML: 5 INJECTION INTRAVENOUS at 11:33

## 2023-07-13 ENCOUNTER — CASE MANAGEMENT (OUTPATIENT)
Dept: WOMENS IMAGING | Age: 43
End: 2023-07-13

## 2023-07-18 ENCOUNTER — TELEPHONE (OUTPATIENT)
Dept: BREAST CENTER | Age: 43
End: 2023-07-18

## 2023-07-18 DIAGNOSIS — R92.8 ABNORMAL MRI, BREAST: Primary | ICD-10-CM

## 2023-07-24 ENCOUNTER — HOSPITAL ENCOUNTER (OUTPATIENT)
Dept: ULTRASOUND IMAGING | Age: 43
Discharge: HOME | End: 2023-07-24
Payer: COMMERCIAL

## 2023-07-24 ENCOUNTER — HOSPITAL ENCOUNTER (OUTPATIENT)
Dept: WOMENS IMAGING | Age: 43
Discharge: HOME | End: 2023-07-24
Attending: SURGERY
Payer: COMMERCIAL

## 2023-07-24 DIAGNOSIS — C50.211 MALIGNANT NEOPLASM OF UPPER-INNER QUADRANT OF RIGHT BREAST IN FEMALE, ESTROGEN RECEPTOR POSITIVE (HCC): ICD-10-CM

## 2023-07-24 DIAGNOSIS — R92.8 ABNORMAL MAMMOGRAM: ICD-10-CM

## 2023-07-24 DIAGNOSIS — R92.8 ABNORMAL MRI, BREAST: ICD-10-CM

## 2023-07-24 DIAGNOSIS — Z17.0 MALIGNANT NEOPLASM OF UPPER-INNER QUADRANT OF RIGHT BREAST IN FEMALE, ESTROGEN RECEPTOR POSITIVE (HCC): ICD-10-CM

## 2023-07-24 PROCEDURE — 76642 ULTRASOUND BREAST LIMITED: CPT

## 2023-07-24 PROCEDURE — G0279 TOMOSYNTHESIS, MAMMO: HCPCS

## 2023-07-24 PROCEDURE — 88342 IMHCHEM/IMCYTCHM 1ST ANTB: CPT

## 2023-07-24 PROCEDURE — 88305 TISSUE EXAM BY PATHOLOGIST: CPT

## 2023-07-24 PROCEDURE — 88360 TUMOR IMMUNOHISTOCHEM/MANUAL: CPT

## 2023-07-24 PROCEDURE — 88341 IMHCHEM/IMCYTCHM EA ADD ANTB: CPT

## 2023-07-24 PROCEDURE — 19083 BX BREAST 1ST LESION US IMAG: CPT

## 2023-07-24 ASSESSMENT — PAIN SCALES - GENERAL
PAINLEVEL_OUTOF10: 0
PAINLEVEL_OUTOF10: 0

## 2023-07-24 NOTE — DISCHARGE INSTRUCTIONS
[facility] and Discharge [address] [place] [address]   [address]  Telephone: [phone]    NAME:  Breanne Jordan OF BIRTH:  1980  GENDER: female  MEDICAL RECORD NUMBER:  [id number]  TODAY'S DATE:  @ED@    Discharge Instructions [facility]:    Post Breast Biopsy Instructions    [x] Place the provided cold pack inside your bra on top of the dressing for at least 3 hours. You may re-freeze it and use it again later if you have any discomfort. [x] You may take Tylenol (Acetaminophen) the day of your biopsy for any discomfort. [x] Watch for excessive bleeding. If bleeding occurs apply pressure to site. Watch for signs of infection, increased pain, redness, swelling and heat. If this occurs call your physician. [x] Do not participate in any strenuous exercise for 48 hours after your biopsy, such as tennis, aerobics, weight lifting and household activities that involve use of your affected   arm. [x] You may remove your outer dressing in 24 hours and you may shower. \"Steri-strips\" tape will stay on for a few days longer then can be removed. [facility] Information:   Should you experience any significant changes in your health or have questions about your care, please contact:  [address] [place] [address],[address]   Monday-Friday 8:00 am - 4:30 pm.  If you need help with your care outside these hours and cannot wait until we are again available,  contact your Physician or go to the hospital emergency.        Electronically signed by Pattie Cox RN on 7/24/2023 at 3:00 PM

## 2023-07-24 NOTE — PROGRESS NOTES
Breast Biopsy Nursing Note    NAME:  Leonor Sensor OF BIRTH:  1980 GENDER: female  MEDICAL RECORD NUMBER:  [id number]  DATE:  7/24/2023    Breast Biopsy completed by . Expiration date site marker checked immediately prior to use. Package intact prior to use and no damage noted. Site marker was removed from protective sterile packaging by physician. Site marker was placed by physician into left     breast/s. Hemostasis achieved via site pressure; Biopsy site cleansed with alcohol  Steri-strips applied along with small amount of bacitracin-neomycin polymixin then Coverderm   Breast Biopsy tissue was placed in proper container/s and labeled. Breast Biopsy tissue was taken to Pathology for evaluation. Procedural Pain:  0  / 10     Post Procedural Pain:  0 / 10     Procedure well tolerated. Pt stable and alert and oriented x 3 upon discharge. Ice pack placed over biopsy site. Pt given post-biopsy education and all questions answered. Pt has NN contact info.        Electronically signed by Landon Serrato RN on 7/24/2023 at 3:01 PM

## 2023-07-26 ENCOUNTER — CASE MANAGEMENT (OUTPATIENT)
Dept: WOMENS IMAGING | Age: 43
End: 2023-07-26

## 2023-07-26 DIAGNOSIS — N63.20 MASS OF LEFT BREAST, UNSPECIFIED QUADRANT: Primary | ICD-10-CM

## 2023-07-26 DIAGNOSIS — C50.211 MALIGNANT NEOPLASM OF UPPER-INNER QUADRANT OF RIGHT BREAST IN FEMALE, ESTROGEN RECEPTOR POSITIVE (HCC): Primary | ICD-10-CM

## 2023-07-26 DIAGNOSIS — N63.20 MASS OF LEFT BREAST, UNSPECIFIED QUADRANT: ICD-10-CM

## 2023-07-26 DIAGNOSIS — N63.12 MASS OF UPPER INNER QUADRANT OF RIGHT BREAST: ICD-10-CM

## 2023-07-26 DIAGNOSIS — Z17.0 MALIGNANT NEOPLASM OF UPPER-INNER QUADRANT OF RIGHT BREAST IN FEMALE, ESTROGEN RECEPTOR POSITIVE (HCC): Primary | ICD-10-CM

## 2023-07-26 NOTE — PROGRESS NOTES
TAG Wireless Localization scheduled with patient. RN and patient discussed medical history, allergies, and current medication list. Pre-Localization instructions given to patient. Patient denies any further questions at this time. Pt to report to [facility] on 8.1 at 0900 for a 0930 localization.

## 2023-07-27 ENCOUNTER — OFFICE VISIT (OUTPATIENT)
Dept: FAMILY MEDICINE CLINIC | Age: 43
End: 2023-07-27
Payer: COMMERCIAL

## 2023-07-27 VITALS
WEIGHT: 266.6 LBS | BODY MASS INDEX: 36.11 KG/M2 | HEIGHT: 72 IN | DIASTOLIC BLOOD PRESSURE: 80 MMHG | SYSTOLIC BLOOD PRESSURE: 126 MMHG

## 2023-07-27 DIAGNOSIS — D24.2 PAPILLOMA OF LEFT BREAST: ICD-10-CM

## 2023-07-27 DIAGNOSIS — N63.10 MASS OF RIGHT BREAST, UNSPECIFIED QUADRANT: ICD-10-CM

## 2023-07-27 DIAGNOSIS — Z01.818 PRE-OP EVALUATION: Primary | ICD-10-CM

## 2023-07-27 LAB
ALBUMIN SERPL-MCNC: 4.2 G/DL (ref 3.4–5)
ALBUMIN/GLOB SERPL: 1.7 {RATIO} (ref 1.1–2.2)
ALP SERPL-CCNC: 67 U/L (ref 40–129)
ALT SERPL-CCNC: 10 U/L (ref 10–40)
ANION GAP SERPL CALCULATED.3IONS-SCNC: 11 MMOL/L (ref 3–16)
AST SERPL-CCNC: 12 U/L (ref 15–37)
BILIRUB SERPL-MCNC: 0.3 MG/DL (ref 0–1)
BUN SERPL-MCNC: 16 MG/DL (ref 7–20)
CALCIUM SERPL-MCNC: 9.4 MG/DL (ref 8.3–10.6)
CHLORIDE SERPL-SCNC: 105 MMOL/L (ref 99–110)
CO2 SERPL-SCNC: 25 MMOL/L (ref 21–32)
CREAT SERPL-MCNC: 0.9 MG/DL (ref 0.6–1.1)
DEPRECATED RDW RBC AUTO: 13.7 % (ref 12.4–15.4)
GFR SERPLBLD CREATININE-BSD FMLA CKD-EPI: >60 ML/MIN/{1.73_M2}
GLUCOSE SERPL-MCNC: 85 MG/DL (ref 70–99)
HCT VFR BLD AUTO: 38.6 % (ref 36–48)
HGB BLD-MCNC: 13.2 G/DL (ref 12–16)
MCH RBC QN AUTO: 30.1 PG (ref 26–34)
MCHC RBC AUTO-ENTMCNC: 34.1 G/DL (ref 31–36)
MCV RBC AUTO: 88.3 FL (ref 80–100)
PLATELET # BLD AUTO: 271 K/UL (ref 135–450)
PMV BLD AUTO: 7.8 FL (ref 5–10.5)
POTASSIUM SERPL-SCNC: 4.6 MMOL/L (ref 3.5–5.1)
PROT SERPL-MCNC: 6.7 G/DL (ref 6.4–8.2)
RBC # BLD AUTO: 4.38 M/UL (ref 4–5.2)
SODIUM SERPL-SCNC: 141 MMOL/L (ref 136–145)
WBC # BLD AUTO: 7.8 K/UL (ref 4–11)

## 2023-07-27 PROCEDURE — 93000 ELECTROCARDIOGRAM COMPLETE: CPT | Performed by: FAMILY MEDICINE

## 2023-07-27 PROCEDURE — 36415 COLL VENOUS BLD VENIPUNCTURE: CPT | Performed by: FAMILY MEDICINE

## 2023-07-27 PROCEDURE — 99214 OFFICE O/P EST MOD 30 MIN: CPT | Performed by: FAMILY MEDICINE

## 2023-07-27 SDOH — ECONOMIC STABILITY: FOOD INSECURITY: WITHIN THE PAST 12 MONTHS, YOU WORRIED THAT YOUR FOOD WOULD RUN OUT BEFORE YOU GOT MONEY TO BUY MORE.: NEVER TRUE

## 2023-07-27 SDOH — ECONOMIC STABILITY: FOOD INSECURITY: WITHIN THE PAST 12 MONTHS, THE FOOD YOU BOUGHT JUST DIDN'T LAST AND YOU DIDN'T HAVE MONEY TO GET MORE.: NEVER TRUE

## 2023-07-27 SDOH — ECONOMIC STABILITY: INCOME INSECURITY: HOW HARD IS IT FOR YOU TO PAY FOR THE VERY BASICS LIKE FOOD, HOUSING, MEDICAL CARE, AND HEATING?: NOT HARD AT ALL

## 2023-07-27 SDOH — ECONOMIC STABILITY: HOUSING INSECURITY
IN THE LAST 12 MONTHS, WAS THERE A TIME WHEN YOU DID NOT HAVE A STEADY PLACE TO SLEEP OR SLEPT IN A SHELTER (INCLUDING NOW)?: NO

## 2023-07-27 ASSESSMENT — PATIENT HEALTH QUESTIONNAIRE - PHQ9
SUM OF ALL RESPONSES TO PHQ QUESTIONS 1-9: 0
SUM OF ALL RESPONSES TO PHQ QUESTIONS 1-9: 0
2. FEELING DOWN, DEPRESSED OR HOPELESS: 0
SUM OF ALL RESPONSES TO PHQ QUESTIONS 1-9: 0
SUM OF ALL RESPONSES TO PHQ QUESTIONS 1-9: 0
SUM OF ALL RESPONSES TO PHQ9 QUESTIONS 1 & 2: 0
1. LITTLE INTEREST OR PLEASURE IN DOING THINGS: 0

## 2023-07-27 ASSESSMENT — ENCOUNTER SYMPTOMS
RESPIRATORY NEGATIVE: 1
GASTROINTESTINAL NEGATIVE: 1

## 2023-07-28 DIAGNOSIS — Z01.818 PRE-OP EVALUATION: ICD-10-CM

## 2023-07-28 LAB
CHOLEST SERPL-MCNC: 167 MG/DL (ref 0–199)
HDLC SERPL-MCNC: 55 MG/DL (ref 40–60)
LDL CHOLESTEROL CALCULATED: 101 MG/DL
TRIGL SERPL-MCNC: 56 MG/DL (ref 0–150)
VLDLC SERPL CALC-MCNC: 11 MG/DL

## 2023-08-01 ENCOUNTER — HOSPITAL ENCOUNTER (OUTPATIENT)
Dept: ULTRASOUND IMAGING | Age: 43
End: 2023-08-01
Payer: COMMERCIAL

## 2023-08-01 ENCOUNTER — HOSPITAL ENCOUNTER (OUTPATIENT)
Dept: ULTRASOUND IMAGING | Age: 43
Discharge: HOME | End: 2023-08-01
Payer: COMMERCIAL

## 2023-08-01 ENCOUNTER — APPOINTMENT (OUTPATIENT)
Dept: WOMENS IMAGING | Age: 43
End: 2023-08-01
Payer: COMMERCIAL

## 2023-08-01 ENCOUNTER — HOSPITAL ENCOUNTER (OUTPATIENT)
Dept: WOMENS IMAGING | Age: 43
Discharge: HOME | End: 2023-08-01
Payer: COMMERCIAL

## 2023-08-01 DIAGNOSIS — N63.12 MASS OF UPPER INNER QUADRANT OF RIGHT BREAST: ICD-10-CM

## 2023-08-01 DIAGNOSIS — R92.8 ABNORMAL MRI, BREAST: ICD-10-CM

## 2023-08-01 DIAGNOSIS — R92.8 ABNORMAL MAMMOGRAM: ICD-10-CM

## 2023-08-01 DIAGNOSIS — N63.20 MASS OF LEFT BREAST, UNSPECIFIED QUADRANT: ICD-10-CM

## 2023-08-01 PROCEDURE — 2709999900 US PLACE BREAST LOC DEVICE 1ST LESION RIGHT

## 2023-08-01 PROCEDURE — G0279 TOMOSYNTHESIS, MAMMO: HCPCS

## 2023-08-01 PROCEDURE — 19285 PERQ DEV BREAST 1ST US IMAG: CPT

## 2023-08-01 ASSESSMENT — PAIN SCALES - GENERAL
PAINLEVEL_OUTOF10: 0
PAINLEVEL_OUTOF10: 0

## 2023-08-01 NOTE — PROGRESS NOTES
NAME:  Arnel Wakefield  YOB: 1980  MEDICAL RECORD NUMBER:  [id number]  EPISODE DATE:  8/1/2023                             Expiration date of localization device was verified . Packaging intact with no observable damage. TAG Localization performed by Dr. Robert Greene for bilateral breast site. Site dressed with steri-strips and Coverderm. Patient tolerated procedure well; alert and oriented x3. Patient discharged to home.        Electronically signed by Sara Lawson RN on 8/1/2023 at 10:47 AM

## 2023-08-01 NOTE — PROGRESS NOTES
PRE-OP INSTRUCTIONS FOR SURGICAL PATIENTS          Our Pre-admission Testing Nurses tried and were unable to reach you today. Please read the attached instructions if you did not listen to your voicemail. Follow all instructions provided to you from your surgeon's office, including your ARRIVAL TIME. Arrange for someone to drive you home and be with you for the first 24 hours after discharge. NOTE: at this time ONLY 2 ADULTS may accompany you   One person encouraged to stay at hospital entire time if outpatient surgery    Enter the MAIN entrance located on [facility] and report to the surgical desk on the LEFT side of the lobby. Please park in the parking garage or there is free B[place]ul available after 7am for your use. Bring your insurance card & photo ID with you to register. Bring your medication list with you with dose and frequency listed (including over the counter medications)  Contact your ordering physician/surgeon for medication instructions as soon as possible, especially if taking blood thinners, aspirin, heart, or diabetic medication. Bariatric surgical patients need to call your surgeon if on diabetic medications (as some may need to be stopped 1-week preop)  A Pre-Surgical History and Physical MUST be completed WITHIN 30 DAYS OR LESS prior to your procedure by your Physician or an Urgent Care. DO NOT EAT ANYTHING 8 hours prior to arrival for surgery. You may have sips of WATER ONLY (up to 8 ounces) 4 hours prior to your arrival for surgery. Then nothing further 4 hours prior to arriving at hospital.   NOTE: ALL Gastric, Bariatric & Bowel surgery patients - you MUST follow your surgeon's instructions regarding eating/drinking as you will have very specific instructions to follow. If you did not receive these, call your surgeon's office immediately. No gum, candy, mints, or ice chips day of procedure.    Please refrain from drinking alcohol the day before or day of your

## 2023-08-02 NOTE — TELEPHONE ENCOUNTER
Last office visit 7/27/2023     Last written      Next office visit scheduled Visit date not found    Requested Prescriptions     Pending Prescriptions Disp Refills    sertraline (ZOLOFT) 50 MG tablet [Pharmacy Med Name: SERTRALINE HCL 50 MG TABLET] 30 tablet 1     Sig: TAKE 1 TABLET BY MOUTH EVERY DAY normal...

## 2023-08-03 ENCOUNTER — ANESTHESIA EVENT (OUTPATIENT)
Dept: OPERATING ROOM | Age: 43
End: 2023-08-03
Payer: COMMERCIAL

## 2023-08-04 ENCOUNTER — HOSPITAL ENCOUNTER (OUTPATIENT)
Age: 43
Setting detail: OUTPATIENT SURGERY
Discharge: HOME | End: 2023-08-04
Attending: SURGERY | Admitting: SURGERY
Payer: COMMERCIAL

## 2023-08-04 ENCOUNTER — HOSPITAL ENCOUNTER (OUTPATIENT)
Dept: NUCLEAR MEDICINE | Age: 43
Discharge: HOME | End: 2023-08-04
Attending: SURGERY
Payer: COMMERCIAL

## 2023-08-04 ENCOUNTER — APPOINTMENT (OUTPATIENT)
Dept: NUCLEAR MEDICINE | Age: 43
End: 2023-08-04
Attending: SURGERY
Payer: COMMERCIAL

## 2023-08-04 ENCOUNTER — ANESTHESIA (OUTPATIENT)
Dept: OPERATING ROOM | Age: 43
End: 2023-08-04
Payer: COMMERCIAL

## 2023-08-04 ENCOUNTER — HOSPITAL ENCOUNTER (OUTPATIENT)
Dept: MAMMOGRAPHY | Age: 43
Discharge: HOME | End: 2023-08-04
Payer: COMMERCIAL

## 2023-08-04 VITALS
HEART RATE: 50 BPM | WEIGHT: 268 LBS | OXYGEN SATURATION: 94 % | SYSTOLIC BLOOD PRESSURE: 106 MMHG | BODY MASS INDEX: 36.3 KG/M2 | DIASTOLIC BLOOD PRESSURE: 61 MMHG | TEMPERATURE: 97.3 F | RESPIRATION RATE: 16 BRPM | HEIGHT: 72 IN

## 2023-08-04 DIAGNOSIS — R92.8 ABNORMAL MAMMOGRAM: ICD-10-CM

## 2023-08-04 DIAGNOSIS — C50.211 MALIGNANT NEOPLASM OF UPPER-INNER QUADRANT OF RIGHT BREAST IN FEMALE, ESTROGEN RECEPTOR POSITIVE (HCC): ICD-10-CM

## 2023-08-04 DIAGNOSIS — N63.20 MASS OF LEFT BREAST, UNSPECIFIED QUADRANT: ICD-10-CM

## 2023-08-04 DIAGNOSIS — Z17.0 MALIGNANT NEOPLASM OF UPPER-INNER QUADRANT OF RIGHT BREAST IN FEMALE, ESTROGEN RECEPTOR POSITIVE (HCC): ICD-10-CM

## 2023-08-04 LAB — HCG UR QL: NEGATIVE

## 2023-08-04 PROCEDURE — 2580000003 HC RX 258: Performed by: ANESTHESIOLOGY

## 2023-08-04 PROCEDURE — 2709999900 HC NON-CHARGEABLE SUPPLY: Performed by: SURGERY

## 2023-08-04 PROCEDURE — 7100000000 HC PACU RECOVERY - FIRST 15 MIN: Performed by: SURGERY

## 2023-08-04 PROCEDURE — 7100000010 HC PHASE II RECOVERY - FIRST 15 MIN: Performed by: SURGERY

## 2023-08-04 PROCEDURE — 2500000003 HC RX 250 WO HCPCS

## 2023-08-04 PROCEDURE — 78195 LYMPH SYSTEM IMAGING: CPT

## 2023-08-04 PROCEDURE — A4648 IMPLANTABLE TISSUE MARKER: HCPCS | Performed by: SURGERY

## 2023-08-04 PROCEDURE — 76098 X-RAY EXAM SURGICAL SPECIMEN: CPT

## 2023-08-04 PROCEDURE — 88342 IMHCHEM/IMCYTCHM 1ST ANTB: CPT

## 2023-08-04 PROCEDURE — 7100000011 HC PHASE II RECOVERY - ADDTL 15 MIN: Performed by: SURGERY

## 2023-08-04 PROCEDURE — 3700000000 HC ANESTHESIA ATTENDED CARE: Performed by: SURGERY

## 2023-08-04 PROCEDURE — 19301 PARTIAL MASTECTOMY: CPT | Performed by: SURGERY

## 2023-08-04 PROCEDURE — 19294 PREPJ TUM CAV IORT PRTL MAST: CPT | Performed by: SURGERY

## 2023-08-04 PROCEDURE — A9520 TC99 TILMANOCEPT DIAG 0.5MCI: HCPCS | Performed by: SURGERY

## 2023-08-04 PROCEDURE — 38525 BIOPSY/REMOVAL LYMPH NODES: CPT | Performed by: SURGERY

## 2023-08-04 PROCEDURE — 7100000001 HC PACU RECOVERY - ADDTL 15 MIN: Performed by: SURGERY

## 2023-08-04 PROCEDURE — 6370000000 HC RX 637 (ALT 250 FOR IP): Performed by: SURGERY

## 2023-08-04 PROCEDURE — 14001 TIS TRNFR TRUNK 10.1-30SQCM: CPT | Performed by: SURGERY

## 2023-08-04 PROCEDURE — 3600000004 HC SURGERY LEVEL 4 BASE: Performed by: SURGERY

## 2023-08-04 PROCEDURE — 88307 TISSUE EXAM BY PATHOLOGIST: CPT

## 2023-08-04 PROCEDURE — 6360000002 HC RX W HCPCS

## 2023-08-04 PROCEDURE — 84703 CHORIONIC GONADOTROPIN ASSAY: CPT

## 2023-08-04 PROCEDURE — 88305 TISSUE EXAM BY PATHOLOGIST: CPT

## 2023-08-04 PROCEDURE — 6360000002 HC RX W HCPCS: Performed by: SURGERY

## 2023-08-04 PROCEDURE — 2580000003 HC RX 258: Performed by: SURGERY

## 2023-08-04 PROCEDURE — 2720000010 HC SURG SUPPLY STERILE: Performed by: SURGERY

## 2023-08-04 PROCEDURE — 2580000003 HC RX 258

## 2023-08-04 PROCEDURE — 3430000000 HC RX DIAGNOSTIC RADIOPHARMACEUTICAL: Performed by: SURGERY

## 2023-08-04 PROCEDURE — 19125 EXCISION BREAST LESION: CPT | Performed by: SURGERY

## 2023-08-04 PROCEDURE — 3700000001 HC ADD 15 MINUTES (ANESTHESIA): Performed by: SURGERY

## 2023-08-04 PROCEDURE — 3600000014 HC SURGERY LEVEL 4 ADDTL 15MIN: Performed by: SURGERY

## 2023-08-04 DEVICE — MARKER SURG TISS W2XH1XL2CM BIOZORB LO PROF: Type: IMPLANTABLE DEVICE | Site: BREAST | Status: FUNCTIONAL

## 2023-08-04 RX ORDER — LABETALOL HYDROCHLORIDE 5 MG/ML
10 INJECTION, SOLUTION INTRAVENOUS
Status: DISCONTINUED | OUTPATIENT
Start: 2023-08-04 | End: 2023-08-04 | Stop reason: HOSPADM

## 2023-08-04 RX ORDER — ONDANSETRON 2 MG/ML
4 INJECTION INTRAMUSCULAR; INTRAVENOUS
Status: DISCONTINUED | OUTPATIENT
Start: 2023-08-04 | End: 2023-08-04 | Stop reason: HOSPADM

## 2023-08-04 RX ORDER — ONDANSETRON 2 MG/ML
INJECTION INTRAMUSCULAR; INTRAVENOUS PRN
Status: DISCONTINUED | OUTPATIENT
Start: 2023-08-04 | End: 2023-08-04 | Stop reason: SDUPTHER

## 2023-08-04 RX ORDER — FENTANYL CITRATE 50 UG/ML
INJECTION, SOLUTION INTRAMUSCULAR; INTRAVENOUS PRN
Status: DISCONTINUED | OUTPATIENT
Start: 2023-08-04 | End: 2023-08-04 | Stop reason: SDUPTHER

## 2023-08-04 RX ORDER — ACETAMINOPHEN 325 MG/1
650 TABLET ORAL
Status: DISCONTINUED | OUTPATIENT
Start: 2023-08-04 | End: 2023-08-04 | Stop reason: HOSPADM

## 2023-08-04 RX ORDER — KETOROLAC TROMETHAMINE 15 MG/ML
INJECTION, SOLUTION INTRAMUSCULAR; INTRAVENOUS PRN
Status: DISCONTINUED | OUTPATIENT
Start: 2023-08-04 | End: 2023-08-04 | Stop reason: SDUPTHER

## 2023-08-04 RX ORDER — OXYCODONE HYDROCHLORIDE AND ACETAMINOPHEN 5; 325 MG/1; MG/1
1 TABLET ORAL EVERY 6 HOURS PRN
Qty: 8 TABLET | Refills: 0 | Status: SHIPPED | OUTPATIENT
Start: 2023-08-04 | End: 2023-08-07

## 2023-08-04 RX ORDER — LORAZEPAM 2 MG/ML
0.5 INJECTION INTRAMUSCULAR
Status: DISCONTINUED | OUTPATIENT
Start: 2023-08-04 | End: 2023-08-04 | Stop reason: HOSPADM

## 2023-08-04 RX ORDER — DIPHENHYDRAMINE HYDROCHLORIDE 50 MG/ML
12.5 INJECTION INTRAMUSCULAR; INTRAVENOUS
Status: DISCONTINUED | OUTPATIENT
Start: 2023-08-04 | End: 2023-08-04 | Stop reason: HOSPADM

## 2023-08-04 RX ORDER — SODIUM CHLORIDE 0.9 % (FLUSH) 0.9 %
5-40 SYRINGE (ML) INJECTION PRN
Status: DISCONTINUED | OUTPATIENT
Start: 2023-08-04 | End: 2023-08-04 | Stop reason: HOSPADM

## 2023-08-04 RX ORDER — HYDROMORPHONE HYDROCHLORIDE 2 MG/ML
INJECTION, SOLUTION INTRAMUSCULAR; INTRAVENOUS; SUBCUTANEOUS PRN
Status: DISCONTINUED | OUTPATIENT
Start: 2023-08-04 | End: 2023-08-04 | Stop reason: SDUPTHER

## 2023-08-04 RX ORDER — PROPOFOL 10 MG/ML
INJECTION, EMULSION INTRAVENOUS PRN
Status: DISCONTINUED | OUTPATIENT
Start: 2023-08-04 | End: 2023-08-04 | Stop reason: SDUPTHER

## 2023-08-04 RX ORDER — BUPIVACAINE HYDROCHLORIDE 5 MG/ML
INJECTION, SOLUTION EPIDURAL; INTRACAUDAL PRN
Status: DISCONTINUED | OUTPATIENT
Start: 2023-08-04 | End: 2023-08-04 | Stop reason: ALTCHOICE

## 2023-08-04 RX ORDER — ACETAMINOPHEN 325 MG/1
650 TABLET ORAL
Status: COMPLETED | OUTPATIENT
Start: 2023-08-04 | End: 2023-08-04

## 2023-08-04 RX ORDER — SODIUM CHLORIDE 0.9 % (FLUSH) 0.9 %
5-40 SYRINGE (ML) INJECTION EVERY 12 HOURS SCHEDULED
Status: DISCONTINUED | OUTPATIENT
Start: 2023-08-04 | End: 2023-08-04 | Stop reason: HOSPADM

## 2023-08-04 RX ORDER — FAMOTIDINE 10 MG/ML
INJECTION, SOLUTION INTRAVENOUS PRN
Status: DISCONTINUED | OUTPATIENT
Start: 2023-08-04 | End: 2023-08-04 | Stop reason: SDUPTHER

## 2023-08-04 RX ORDER — MIDAZOLAM HYDROCHLORIDE 1 MG/ML
INJECTION INTRAMUSCULAR; INTRAVENOUS PRN
Status: DISCONTINUED | OUTPATIENT
Start: 2023-08-04 | End: 2023-08-04 | Stop reason: SDUPTHER

## 2023-08-04 RX ORDER — LIDOCAINE HYDROCHLORIDE 20 MG/ML
INJECTION, SOLUTION EPIDURAL; INFILTRATION; INTRACAUDAL; PERINEURAL PRN
Status: DISCONTINUED | OUTPATIENT
Start: 2023-08-04 | End: 2023-08-04 | Stop reason: SDUPTHER

## 2023-08-04 RX ORDER — GLYCOPYRROLATE 0.2 MG/ML
INJECTION INTRAMUSCULAR; INTRAVENOUS PRN
Status: DISCONTINUED | OUTPATIENT
Start: 2023-08-04 | End: 2023-08-04 | Stop reason: SDUPTHER

## 2023-08-04 RX ORDER — MEPERIDINE HYDROCHLORIDE 25 MG/ML
12.5 INJECTION INTRAMUSCULAR; INTRAVENOUS; SUBCUTANEOUS EVERY 5 MIN PRN
Status: DISCONTINUED | OUTPATIENT
Start: 2023-08-04 | End: 2023-08-04 | Stop reason: HOSPADM

## 2023-08-04 RX ORDER — DEXAMETHASONE SODIUM PHOSPHATE 10 MG/ML
INJECTION, SOLUTION INTRAMUSCULAR; INTRAVENOUS PRN
Status: DISCONTINUED | OUTPATIENT
Start: 2023-08-04 | End: 2023-08-04 | Stop reason: SDUPTHER

## 2023-08-04 RX ORDER — PROCHLORPERAZINE EDISYLATE 5 MG/ML
5 INJECTION INTRAMUSCULAR; INTRAVENOUS
Status: DISCONTINUED | OUTPATIENT
Start: 2023-08-04 | End: 2023-08-04 | Stop reason: HOSPADM

## 2023-08-04 RX ORDER — SODIUM CHLORIDE 9 MG/ML
INJECTION, SOLUTION INTRAVENOUS PRN
Status: DISCONTINUED | OUTPATIENT
Start: 2023-08-04 | End: 2023-08-04 | Stop reason: HOSPADM

## 2023-08-04 RX ORDER — IPRATROPIUM BROMIDE AND ALBUTEROL SULFATE 2.5; .5 MG/3ML; MG/3ML
1 SOLUTION RESPIRATORY (INHALATION)
Status: DISCONTINUED | OUTPATIENT
Start: 2023-08-04 | End: 2023-08-04 | Stop reason: HOSPADM

## 2023-08-04 RX ORDER — SODIUM CHLORIDE, SODIUM LACTATE, POTASSIUM CHLORIDE, CALCIUM CHLORIDE 600; 310; 30; 20 MG/100ML; MG/100ML; MG/100ML; MG/100ML
INJECTION, SOLUTION INTRAVENOUS CONTINUOUS
Status: DISCONTINUED | OUTPATIENT
Start: 2023-08-04 | End: 2023-08-04 | Stop reason: HOSPADM

## 2023-08-04 RX ORDER — FENTANYL CITRATE 50 UG/ML
25 INJECTION, SOLUTION INTRAMUSCULAR; INTRAVENOUS EVERY 5 MIN PRN
Status: DISCONTINUED | OUTPATIENT
Start: 2023-08-04 | End: 2023-08-04 | Stop reason: HOSPADM

## 2023-08-04 RX ADMIN — ACETAMINOPHEN 650 MG: 325 TABLET ORAL at 08:27

## 2023-08-04 RX ADMIN — FAMOTIDINE 20 MG: 10 INJECTION, SOLUTION INTRAVENOUS at 09:00

## 2023-08-04 RX ADMIN — DEXMEDETOMIDINE HYDROCHLORIDE 6 MCG: 100 INJECTION, SOLUTION INTRAVENOUS at 09:20

## 2023-08-04 RX ADMIN — SODIUM CHLORIDE, POTASSIUM CHLORIDE, SODIUM LACTATE AND CALCIUM CHLORIDE: 600; 310; 30; 20 INJECTION, SOLUTION INTRAVENOUS at 08:27

## 2023-08-04 RX ADMIN — DEXMEDETOMIDINE HYDROCHLORIDE 6 MCG: 100 INJECTION, SOLUTION INTRAVENOUS at 10:01

## 2023-08-04 RX ADMIN — DEXAMETHASONE SODIUM PHOSPHATE 10 MG: 10 INJECTION, SOLUTION INTRAMUSCULAR; INTRAVENOUS at 09:00

## 2023-08-04 RX ADMIN — MIDAZOLAM HYDROCHLORIDE 2 MG: 2 INJECTION, SOLUTION INTRAMUSCULAR; INTRAVENOUS at 08:48

## 2023-08-04 RX ADMIN — HYDROMORPHONE HYDROCHLORIDE 0.5 MG: 2 INJECTION, SOLUTION INTRAMUSCULAR; INTRAVENOUS; SUBCUTANEOUS at 11:02

## 2023-08-04 RX ADMIN — LIDOCAINE HYDROCHLORIDE 100 MG: 20 INJECTION, SOLUTION EPIDURAL; INFILTRATION; INTRACAUDAL; PERINEURAL at 08:54

## 2023-08-04 RX ADMIN — CEFAZOLIN 2000 MG: 2 INJECTION, POWDER, FOR SOLUTION INTRAMUSCULAR; INTRAVENOUS at 09:00

## 2023-08-04 RX ADMIN — FENTANYL CITRATE 100 MCG: 50 INJECTION, SOLUTION INTRAMUSCULAR; INTRAVENOUS at 08:54

## 2023-08-04 RX ADMIN — HYDROMORPHONE HYDROCHLORIDE 0.5 MG: 2 INJECTION, SOLUTION INTRAMUSCULAR; INTRAVENOUS; SUBCUTANEOUS at 10:32

## 2023-08-04 RX ADMIN — DEXMEDETOMIDINE HYDROCHLORIDE 4 MCG: 100 INJECTION, SOLUTION INTRAVENOUS at 11:07

## 2023-08-04 RX ADMIN — GLYCOPYRROLATE 0.2 MG: 0.2 INJECTION INTRAMUSCULAR; INTRAVENOUS at 08:59

## 2023-08-04 RX ADMIN — PROPOFOL 50 MG: 10 INJECTION, EMULSION INTRAVENOUS at 08:59

## 2023-08-04 RX ADMIN — HYDROMORPHONE HYDROCHLORIDE 0.5 MG: 2 INJECTION, SOLUTION INTRAMUSCULAR; INTRAVENOUS; SUBCUTANEOUS at 09:30

## 2023-08-04 RX ADMIN — DEXMEDETOMIDINE HYDROCHLORIDE 4 MCG: 100 INJECTION, SOLUTION INTRAVENOUS at 09:27

## 2023-08-04 RX ADMIN — DEXMEDETOMIDINE HYDROCHLORIDE 4 MCG: 100 INJECTION, SOLUTION INTRAVENOUS at 09:52

## 2023-08-04 RX ADMIN — HYDROMORPHONE HYDROCHLORIDE 0.5 MG: 2 INJECTION, SOLUTION INTRAMUSCULAR; INTRAVENOUS; SUBCUTANEOUS at 09:51

## 2023-08-04 RX ADMIN — SODIUM CHLORIDE, POTASSIUM CHLORIDE, SODIUM LACTATE AND CALCIUM CHLORIDE: 600; 310; 30; 20 INJECTION, SOLUTION INTRAVENOUS at 10:18

## 2023-08-04 RX ADMIN — TILMANOCEPT 830 MICRO CURIE: KIT at 09:03

## 2023-08-04 RX ADMIN — PROPOFOL 200 MG: 10 INJECTION, EMULSION INTRAVENOUS at 08:54

## 2023-08-04 RX ADMIN — DEXMEDETOMIDINE HYDROCHLORIDE 4 MCG: 100 INJECTION, SOLUTION INTRAVENOUS at 11:02

## 2023-08-04 RX ADMIN — ONDANSETRON 4 MG: 2 INJECTION INTRAMUSCULAR; INTRAVENOUS at 09:00

## 2023-08-04 RX ADMIN — KETOROLAC TROMETHAMINE 15 MG: 15 INJECTION, SOLUTION INTRAMUSCULAR; INTRAVENOUS at 10:41

## 2023-08-04 ASSESSMENT — PAIN SCALES - GENERAL
PAINLEVEL_OUTOF10: 0

## 2023-08-04 ASSESSMENT — PAIN - FUNCTIONAL ASSESSMENT: PAIN_FUNCTIONAL_ASSESSMENT: 0-10

## 2023-08-04 NOTE — BRIEF OP NOTE
Brief Postoperative Note      Patient: Luca Ruff  YOB: 1980  MRN: [id number]    Date of Procedure: 8/4/2023    Pre-Op Diagnosis Codes:     * Malignant neoplasm of upper-inner quadrant of right breast in female, estrogen receptor positive ([address]) [C50.211, Z17.0]     * Mass of left breast, unspecified quadrant [N63.20]    Post-Op Diagnosis: Same       Procedure(s):  RADIO FREQUENCY IDENTIFIED DEVICE LOCALIZED RIGHT BREAST LUMPECTOMY WITH RIGHT AXILLARY SENTINEL NODE BIOPSY/  . RADIOFREQUENCY IDENTIFIED DEVICE LOCALIZED LEFT BREAST EXCISIONAL BIOPSY    Surgeon(s): Anna Thomas MD    Assistant:  Resident:  Kulwinder Del Rosario DO    Anesthesia: General    Estimated Blood Loss (mL): Minimal    Complications: None    Specimens:   ID Type Source Tests Collected by Time Destination   A : right breast cancer , short superior long lateral  Tissue Breast SURGICAL PATHOLOGY Anna Thomas MD 8/4/2023 7476    B : right breast medial  Tissue Breast SURGICAL PATHOLOGY Anna Thomas MD 8/4/2023 8294    C : right breast anterior lateral  Tissue Breast SURGICAL PATHOLOGY Anna Thomas MD 8/4/2023 0945    D : right breast posterior  Tissue Breast SURGICAL PATHOLOGY Anna Thomas MD 8/4/2023 3963    E : right breast superior  Tissue Breast SURGICAL PATHOLOGY Anna Thomas MD 8/4/2023 7545    F : right breast inferior  Tissue Breast SURGICAL PATHOLOGY Anna Thomas MD 8/4/2023 7198    G : right axillary sentinal node Specimen Axillary SURGICAL PATHOLOGY Anna Thomas MD 8/4/2023 1000    H : left breast mass, short superior long lateral  Tissue Breast SURGICAL PATHOLOGY Anna Thomas MD 8/4/2023 1033        Implants:  * No implants in log *      Drains: * No LDAs found *    Findings: Right partial mastectomy with clip localized with rfid and confirmed by specimen xray  Right sentinel lymph node biopsy x2, first node with radiotracer activity 5005, second with palpable nodules, no

## 2023-08-04 NOTE — OP NOTE
sentinel node. The bilateral breast and axillary regions were then prepped and draped in the usual sterile fashion. Patient had also gone to radiology prior to surgery for RFID localization. An incision was made in the medial periareolar border on the right breast and carried down through the skin and subcutaneous tissues. Flaps were created in the subcutanous tissue around the area of the tumor. The tumor was localized using the RFID localizer. The tissue around the tumor was dissected free from the surrounding breast tissue. The specimen was marked with a short suture superiorly, and a long suture laterally, and this was sent to radiology, which confirmed removal of the area in question, and then sent to pathology for permanent section. I took additional margins from all 6 sides, and these were marked with ink on the new true margins and also sent to pathology. There was a fairly large surgical defect caused by  the removal of the breast tissue which was measured to be 4 cm x 4 cm in size. This made a defect measuring 16 cm2. It was elected to repair the surgical  defect using an oncoplastic tissue advancement procedure to achieve a breast  reconstruction with local tissue flaps. Tissue mobilization was done by dissecting through the breast parenchyma and  creating a pedicled superior and inferior tissue flap using cautery, and the flaps were  then mobilized and transferred to the central aspect of the surgical cavity. Care was taken to preserve the vascularity of the flaps. I placed a 2 cm low profile Biozorb device into the cavity. The deep tissue was then reapproximated to the Biozorb using interrupted 3-0 PDS suture. The subcutaneous tissue was closed with 3-0 vicryl. Once this was completed, the deformity in the breast was no longer present. Hemostasis was assured. The wound was irrigated and injected with local anesthetic.     A transverse incision was then made in the right axilla, and carried down AM          Electronically signed by Mena Keenan MD on 8/4/2023 at 11:17 AM

## 2023-08-04 NOTE — PROGRESS NOTES
Patient to pacu 17 s/p RADIO FREQUENCY IDENTIFIED DEVICE LOCALIZED RIGHT BREAST LUMPECTOMY WITH RIGHT AXILLARY SENTINEL NODE BIOPSY/ - Right  General  . - Bilateral, General  RADIOFREQUENCY IDENTIFIED DEVICE LOCALIZED LEFT BREAST EXCISIONAL BIOPSY - Left, General, report received from CRNA< reported hemodynamically stable intra op, all vitals stable upon arrival. Denies pain at this time.

## 2023-08-04 NOTE — ANESTHESIA POSTPROCEDURE EVALUATION
Department of Anesthesiology  Postprocedure Note    Patient: Taqueria Farrell  MRN: [id number]  YOB: 1980  Date of evaluation: 8/4/2023      Procedure Summary     Date: 08/04/23 Room / Location: [address] [address] [facility]    Anesthesia Start: 0848 Anesthesia Stop: 9247    Procedures:       RADIO FREQUENCY IDENTIFIED DEVICE LOCALIZED RIGHT BREAST LUMPECTOMY WITH RIGHT AXILLARY SENTINEL NODE BIOPSY/ (Right)      . (Bilateral)      RADIOFREQUENCY IDENTIFIED DEVICE LOCALIZED LEFT BREAST EXCISIONAL BIOPSY (Left) Diagnosis:       Malignant neoplasm of upper-inner quadrant of right breast in female, estrogen receptor positive ([address])      Mass of left breast, unspecified quadrant      (Malignant neoplasm of upper-inner quadrant of right breast in female, estrogen receptor positive ([address]) [C50.211, Z17.0])      (Mass of left breast, unspecified quadrant [N63.20])    Surgeons: Brandon Salcido MD Responsible Provider: Melissa Hough MD    Anesthesia Type: general ASA Status: 2          Anesthesia Type: No value filed.     Arabella Phase I: Arabella Score: 10    Arabella Phase II: Arabella Score: 10      Anesthesia Post Evaluation    Patient location during evaluation: PACU  Level of consciousness: awake  Complications: no  Multimodal analgesia pain management approach

## 2023-08-04 NOTE — H&P
Update History & Physical    The patient's History and Physical of July 27, 2023 was reviewed with the patient and I examined the patient. There was no change. The surgical site was confirmed by the patient and me. Plan: The risks, benefits, expected outcome, and alternative to the recommended procedure have been discussed with the patient. Patient understands and wants to proceed with the procedure.      Electronically signed by Helio Obando MD on 8/4/2023 at 8:38 AM

## 2023-08-11 ENCOUNTER — OFFICE VISIT (OUTPATIENT)
Dept: SURGERY | Age: 43
End: 2023-08-11

## 2023-08-11 VITALS
WEIGHT: 266 LBS | HEART RATE: 74 BPM | SYSTOLIC BLOOD PRESSURE: 102 MMHG | RESPIRATION RATE: 14 BRPM | BODY MASS INDEX: 36.03 KG/M2 | DIASTOLIC BLOOD PRESSURE: 71 MMHG | OXYGEN SATURATION: 96 % | HEIGHT: 72 IN

## 2023-08-11 DIAGNOSIS — D24.2 INTRADUCTAL PAPILLOMA OF LEFT BREAST: ICD-10-CM

## 2023-08-11 DIAGNOSIS — Z17.0 MALIGNANT NEOPLASM OF UPPER-INNER QUADRANT OF RIGHT BREAST IN FEMALE, ESTROGEN RECEPTOR POSITIVE (HCC): Primary | ICD-10-CM

## 2023-08-11 DIAGNOSIS — Z90.11 S/P PARTIAL MASTECTOMY, RIGHT: ICD-10-CM

## 2023-08-11 DIAGNOSIS — C50.211 MALIGNANT NEOPLASM OF UPPER-INNER QUADRANT OF RIGHT BREAST IN FEMALE, ESTROGEN RECEPTOR POSITIVE (HCC): Primary | ICD-10-CM

## 2023-08-11 PROCEDURE — 99024 POSTOP FOLLOW-UP VISIT: CPT | Performed by: SURGERY

## 2023-08-11 NOTE — PROGRESS NOTES
8/11/2023    Chief Complaint   Patient presents with    Post-Op Check        HPI Patient is s/p right lumpectomy/snbx 8/4/2023 for grade 2 T2N0 ductal carcinoma, ER/WI positive, Her2 negative, and also left breast excisional biopsy for an intraductal papilloma. Pathology reviewed with patient. Wounds healing well, instructed on wound care. She is still having soreness in her axillary region. Discussed measures to help this. Follow up 3 months. Current Outpatient Medications:     sertraline (ZOLOFT) 50 MG tablet, TAKE 1 TABLET BY MOUTH EVERY DAY, Disp: 30 tablet, Rfl: 1    Prenatal MV-Min-Fe Fum-FA-DHA (PRENATAL 1 PO), Take by mouth, Disp: , Rfl:     levothyroxine (SYNTHROID) 200 MCG tablet, TAKE 1 TABLET BY MOUTH EVERY DAY, Disp: 90 tablet, Rfl: 0    buPROPion (WELLBUTRIN SR) 200 MG extended release tablet, TAKE 1 TABLET BY MOUTH EVERY DAY, Disp: 90 tablet, Rfl: 1    meloxicam (MOBIC) 15 MG tablet, TAKE 1 TABLET BY MOUTH EVERY DAY IN THE MORNING, Disp: 30 tablet, Rfl: 3      Past Medical History:   Diagnosis Date    ADHD (attention deficit hyperactivity disorder)     Anxiety     Cancer (HCC) 6/27/2023    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  performed by Loretta Red MD at [facility] Left 8/4/2023    RADIOFREQUENCY IDENTIFIED DEVICE LOCALIZED LEFT BREAST EXCISIONAL BIOPSY performed by Loretta Red MD at [facility]  [facility] LEFT Left 7/24/2023    US BREAST BIOPSY W LOC DEVICE 1ST LESION LEFT 7/24/2023 [facility] ULTRASOUND    US GUIDED NEEDLE LOC OF LEFT BREAST Left 8/1/2023    US GUIDED NEEDLE LOC OF LEFT BREAST

## 2023-08-11 NOTE — PATIENT INSTRUCTIONS
R breast lumpectomy post op    Starting next week start gently massage the surgical site with lotion mainly the one under the right arm    Take tylenol before being active    Preform range of motion exercises for shoulders    Breast exam performed, no palpable masses. Continue self breast exams    Healthy Lifestyle Recommendations: healthy diet (decrease consumption of red meat, increase fresh fruits and vegetables), decreased alcohol consumption (less than 4 drinks/week), adequate sleep (goal 6-8 hours), routine exercise (goal 150 minutes/week or greater), weight control.      Return: 3 month breast exam

## 2023-08-17 ENCOUNTER — E-VISIT (OUTPATIENT)
Dept: FAMILY MEDICINE CLINIC | Age: 43
End: 2023-08-17
Payer: COMMERCIAL

## 2023-08-17 DIAGNOSIS — J01.90 ACUTE BACTERIAL SINUSITIS: Primary | ICD-10-CM

## 2023-08-17 DIAGNOSIS — B96.89 ACUTE BACTERIAL SINUSITIS: Primary | ICD-10-CM

## 2023-08-17 PROCEDURE — 99422 OL DIG E/M SVC 11-20 MIN: CPT | Performed by: FAMILY MEDICINE

## 2023-08-17 RX ORDER — AZITHROMYCIN 250 MG/1
250 TABLET, FILM COATED ORAL SEE ADMIN INSTRUCTIONS
Qty: 6 TABLET | Refills: 0 | Status: SHIPPED | OUTPATIENT
Start: 2023-08-17 | End: 2023-08-22

## 2023-08-17 ASSESSMENT — LIFESTYLE VARIABLES: SMOKING_STATUS: NO, I'VE NEVER SMOKED

## 2023-08-18 RX ORDER — BUPROPION HYDROCHLORIDE 200 MG/1
TABLET, EXTENDED RELEASE ORAL
Qty: 30 TABLET | Refills: 1 | Status: SHIPPED | OUTPATIENT
Start: 2023-08-18

## 2023-08-18 NOTE — TELEPHONE ENCOUNTER
Last office visit 8/17/2023     Last written      Next office visit scheduled Visit date not found    Requested Prescriptions     Pending Prescriptions Disp Refills    buPROPion (WELLBUTRIN SR) 200 MG extended release tablet [Pharmacy Med Name: BUPROPION HCL  MG TABLET] 30 tablet 1     Sig: TAKE 1 TABLET BY MOUTH EVERY DAY

## 2023-09-01 RX ORDER — LEVOTHYROXINE SODIUM 0.2 MG/1
TABLET ORAL
Qty: 90 TABLET | Refills: 0 | Status: SHIPPED | OUTPATIENT
Start: 2023-09-01

## 2023-09-05 NOTE — TELEPHONE ENCOUNTER
Last office visit 8/17/2023     Last written      Next office visit scheduled Visit date not found    Requested Prescriptions     Pending Prescriptions Disp Refills    sertraline (ZOLOFT) 50 MG tablet [Pharmacy Med Name: SERTRALINE HCL 50 MG TABLET] 30 tablet 1     Sig: TAKE 1 TABLET BY MOUTH EVERY DAY

## 2023-09-11 RX ORDER — BUPROPION HYDROCHLORIDE 200 MG/1
TABLET, EXTENDED RELEASE ORAL
Qty: 30 TABLET | Refills: 1 | Status: SHIPPED | OUTPATIENT
Start: 2023-09-11

## 2023-09-11 NOTE — TELEPHONE ENCOUNTER
Last office visit 8/17/2023       Next office visit scheduled Visit date not found    Requested Prescriptions     Pending Prescriptions Disp Refills    buPROPion (WELLBUTRIN SR) 200 MG extended release tablet [Pharmacy Med Name: BUPROPION HCL  MG TABLET] 30 tablet 1     Sig: TAKE 1 TABLET BY MOUTH EVERY DAY

## 2023-09-18 RX ORDER — MELOXICAM 15 MG/1
15 TABLET ORAL EVERY MORNING
Qty: 30 TABLET | Refills: 3 | Status: SHIPPED | OUTPATIENT
Start: 2023-09-18

## 2023-10-12 RX ORDER — LEVOTHYROXINE SODIUM 0.2 MG/1
TABLET ORAL
Qty: 90 TABLET | Refills: 0 | Status: SHIPPED | OUTPATIENT
Start: 2023-10-12

## 2023-10-26 ENCOUNTER — OFFICE VISIT (OUTPATIENT)
Dept: SLEEP MEDICINE | Age: 43
End: 2023-10-26
Payer: COMMERCIAL

## 2023-10-26 VITALS
HEIGHT: 72 IN | SYSTOLIC BLOOD PRESSURE: 98 MMHG | WEIGHT: 263 LBS | TEMPERATURE: 97.6 F | OXYGEN SATURATION: 95 % | HEART RATE: 76 BPM | BODY MASS INDEX: 35.62 KG/M2 | DIASTOLIC BLOOD PRESSURE: 70 MMHG | RESPIRATION RATE: 18 BRPM

## 2023-10-26 DIAGNOSIS — R06.83 SNORING: ICD-10-CM

## 2023-10-26 DIAGNOSIS — G47.33 OSA (OBSTRUCTIVE SLEEP APNEA): Primary | ICD-10-CM

## 2023-10-26 DIAGNOSIS — E66.9 OBESITY (BMI 35.0-39.9 WITHOUT COMORBIDITY): ICD-10-CM

## 2023-10-26 DIAGNOSIS — R06.81 WITNESSED EPISODE OF APNEA: ICD-10-CM

## 2023-10-26 PROCEDURE — 99204 OFFICE O/P NEW MOD 45 MIN: CPT | Performed by: PSYCHIATRY & NEUROLOGY

## 2023-10-26 ASSESSMENT — SLEEP AND FATIGUE QUESTIONNAIRES
ESS TOTAL SCORE: 5
HOW LIKELY ARE YOU TO NOD OFF OR FALL ASLEEP WHILE SITTING QUIETLY AFTER LUNCH WITHOUT ALCOHOL: 1
HOW LIKELY ARE YOU TO NOD OFF OR FALL ASLEEP IN A CAR, WHILE STOPPED FOR A FEW MINUTES IN TRAFFIC: 0
HOW LIKELY ARE YOU TO NOD OFF OR FALL ASLEEP WHILE WATCHING TV: 1
HOW LIKELY ARE YOU TO NOD OFF OR FALL ASLEEP WHILE SITTING AND READING: 1
HOW LIKELY ARE YOU TO NOD OFF OR FALL ASLEEP WHILE SITTING AND TALKING TO SOMEONE: 0
NECK CIRCUMFERENCE (INCHES): 16
HOW LIKELY ARE YOU TO NOD OFF OR FALL ASLEEP WHILE LYING DOWN TO REST IN THE AFTERNOON WHEN CIRCUMSTANCES PERMIT: 1
HOW LIKELY ARE YOU TO NOD OFF OR FALL ASLEEP WHEN YOU ARE A PASSENGER IN A CAR FOR AN HOUR WITHOUT A BREAK: 1
HOW LIKELY ARE YOU TO NOD OFF OR FALL ASLEEP WHILE SITTING INACTIVE IN A PUBLIC PLACE: 0

## 2023-10-26 ASSESSMENT — ENCOUNTER SYMPTOMS
APNEA: 1
GASTROINTESTINAL NEGATIVE: 1
EYES NEGATIVE: 1
ALLERGIC/IMMUNOLOGIC NEGATIVE: 1

## 2023-10-26 NOTE — PATIENT INSTRUCTIONS
Orders Placed This Encounter   Procedures    Home Sleep Study     Standing Status:   Future     Standing Expiration Date:   10/26/2024     Order Specific Question:   Location For Sleep Study     Answer:   [place]     Order Specific Question:   Select Sleep Lab Location     Answer:   [facility]    Sleep Study with PAP Titration     Standing Status:   Future     Standing Expiration Date:   10/26/2024     Order Specific Question:   Sleep Study Titration Type     Answer:   CPAP     Order Specific Question:   Location For Sleep Study     Answer:   [place]     Order Specific Question:   Select Sleep Lab Location     Answer:   [facility]

## 2023-10-26 NOTE — PROGRESS NOTES
file     Unstable Housing in the Last Year: No       Prior to Admission medications    Medication Sig Start Date End Date Taking? Authorizing Provider   levothyroxine (SYNTHROID) 200 MCG tablet TAKE 1 TABLET BY MOUTH EVERY DAY 10/12/23  Yes Waldemar Washburn DO   meloxicam (MOBIC) 15 MG tablet Take 1 tablet by mouth every morning 9/18/23  Yes Waldemar Washburn DO   buPROPion (WELLBUTRIN SR) 200 MG extended release tablet TAKE 1 TABLET BY MOUTH EVERY DAY 9/11/23  Yes Waldemar Washburn DO   sertraline (ZOLOFT) 50 MG tablet TAKE 1 TABLET BY MOUTH EVERY DAY 9/5/23  Yes Waldemar Washburn DO   Prenatal MV-Min-Fe Fum-FA-DHA (PRENATAL 1 PO) Take by mouth   Yes Provider, MD Roseanne       Allergies as of 10/26/2023    (No Known Allergies)       Patient Active Problem List   Diagnosis    Hypothyroidism due to Hashimoto's thyroiditis    Malignant neoplasm of upper-inner quadrant of right breast in female, estrogen receptor positive ([address])    Mass of left breast       Past Medical History:   Diagnosis Date    ADHD (attention deficit hyperactivity disorder)     Anxiety     Cancer ([address]) 6/27/2023    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  performed by Loretta Red MD at [facility] Left 8/4/2023    RADIOFREQUENCY IDENTIFIED DEVICE LOCALIZED LEFT BREAST EXCISIONAL BIOPSY performed [facility]    US BREAST BIOPSY W LOC DEVICE 1ST LESION LEFT 7/24/2023 [facility] ULTRASOUND    US GUIDED NEEDLE LOC OF LEFT BREAST Left 8/1/2023    US GUIDED NEEDLE LOC OF LEFT BREAST 8/1/2023 [facility]

## 2023-11-08 ENCOUNTER — OFFICE VISIT (OUTPATIENT)
Dept: BREAST CENTER | Age: 43
End: 2023-11-08
Payer: COMMERCIAL

## 2023-11-08 ENCOUNTER — HOSPITAL ENCOUNTER (OUTPATIENT)
Dept: SLEEP CENTER | Age: 43
Discharge: HOME | End: 2023-11-08
Attending: PSYCHIATRY & NEUROLOGY
Payer: COMMERCIAL

## 2023-11-08 VITALS — SYSTOLIC BLOOD PRESSURE: 119 MMHG | DIASTOLIC BLOOD PRESSURE: 68 MMHG | HEART RATE: 78 BPM

## 2023-11-08 DIAGNOSIS — Z17.0 MALIGNANT NEOPLASM OF UPPER-INNER QUADRANT OF RIGHT BREAST IN FEMALE, ESTROGEN RECEPTOR POSITIVE (HCC): Primary | ICD-10-CM

## 2023-11-08 DIAGNOSIS — C50.211 MALIGNANT NEOPLASM OF UPPER-INNER QUADRANT OF RIGHT BREAST IN FEMALE, ESTROGEN RECEPTOR POSITIVE (HCC): Primary | ICD-10-CM

## 2023-11-08 DIAGNOSIS — Z90.11 S/P PARTIAL MASTECTOMY, RIGHT: ICD-10-CM

## 2023-11-08 DIAGNOSIS — G47.33 OSA (OBSTRUCTIVE SLEEP APNEA): ICD-10-CM

## 2023-11-08 PROCEDURE — 99213 OFFICE O/P EST LOW 20 MIN: CPT | Performed by: SURGERY

## 2023-11-08 PROCEDURE — 95806 SLEEP STUDY UNATT&RESP EFFT: CPT

## 2023-11-08 NOTE — PATIENT INSTRUCTIONS
Breast exam performed  Gently massage surgical site with lotion   Continue self breast exams    Healthy Lifestyle Recommendations: healthy diet (decrease consumption of red meat, increase fresh fruits and vegetables), decreased alcohol consumption (less than 4 drinks/week), adequate sleep (goal 6-8 hours), routine exercise (goal 150 minutes/week or greater), weight control.      Return: around 5/1/24 mammogram and breast exam

## 2023-11-08 NOTE — PROGRESS NOTES
11/8/2023    Chief Complaint   Patient presents with    Follow-up     Patient presents today for a breast check. She still feels numb under right axillary. Having lots of neck pain also from her cervical discs. 6/10 pain. HPI Patient is s/p right lumpectomy/snbx 8/4/2023 for grade 2 T2N0 ductal carcinoma, ER/AL positive, Her2 negative, and also left breast excisional biopsy for an intraductal papilloma. Postop radiation, finished 10/2023. To start tamoxifen soon. Last mammogram 5/2023 at [facility]. She has not felt any breast masses, no breast pain. She does have some right axillary numbness/discomfort. Current Outpatient Medications:     sertraline (ZOLOFT) 50 MG tablet, TAKE 1 TABLET BY MOUTH EVERY DAY, Disp: 30 tablet, Rfl: 1    levothyroxine (SYNTHROID) 200 MCG tablet, TAKE 1 TABLET BY MOUTH EVERY DAY, Disp: 90 tablet, Rfl: 0    buPROPion (WELLBUTRIN SR) 200 MG extended release tablet, TAKE 1 TABLET BY MOUTH EVERY DAY, Disp: 30 tablet, Rfl: 1    Prenatal MV-Min-Fe Fum-FA-DHA (PRENATAL 1 PO), Take by mouth, Disp: , Rfl:     meloxicam (MOBIC) 15 MG tablet, Take 1 tablet by mouth every morning (Patient not taking: Reported on 11/8/2023), Disp: 30 tablet, Rfl: 3      Past Medical History:   Diagnosis Date    ADHD (attention deficit hyperactivity disorder)     Anxiety     Cancer ([address]) 6/27/2023    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  performed by Mariposa Scruggs MD at [facility] Left 8/4/2023    RADIOFREQUENCY IDENTIFIED DEVICE LOCALIZED LEFT BREAST EXCISIONAL BIOPSY performed by Mariposa Scruggs MD at [address] [place] [address]

## 2023-11-13 PROBLEM — G47.33 OSA (OBSTRUCTIVE SLEEP APNEA): Status: ACTIVE | Noted: 2023-11-13

## 2023-11-17 ENCOUNTER — TELEPHONE (OUTPATIENT)
Dept: SLEEP MEDICINE | Age: 43
End: 2023-11-17

## 2023-11-17 RX ORDER — BUPROPION HYDROCHLORIDE 200 MG/1
TABLET, EXTENDED RELEASE ORAL
Qty: 30 TABLET | Refills: 1 | Status: SHIPPED | OUTPATIENT
Start: 2023-11-17

## 2023-11-17 NOTE — TELEPHONE ENCOUNTER
Sleep study showed mild ISIAH. AHI was 6.4  per hr. And O2 Desaturations to 85%. Dr Gabe Scheuermann titration.       Pt notified of results    Transferred to the sleep lab

## 2023-12-10 ENCOUNTER — HOSPITAL ENCOUNTER (OUTPATIENT)
Dept: SLEEP CENTER | Age: 43
Discharge: HOME | End: 2023-12-10
Payer: COMMERCIAL

## 2023-12-10 DIAGNOSIS — G47.33 OSA (OBSTRUCTIVE SLEEP APNEA): ICD-10-CM

## 2023-12-10 PROCEDURE — 95811 POLYSOM 6/>YRS CPAP 4/> PARM: CPT

## 2023-12-11 PROCEDURE — 95811 POLYSOM 6/>YRS CPAP 4/> PARM: CPT | Performed by: PSYCHIATRY & NEUROLOGY

## 2023-12-13 ENCOUNTER — TELEPHONE (OUTPATIENT)
Dept: PULMONOLOGY | Age: 43
End: 2023-12-13

## 2023-12-13 NOTE — TELEPHONE ENCOUNTER
Titration study shows adequate control of the events at a CPAP pressure of 7 cm. DME:     Patient will need appointment 31-90 days after starting new machine    The patient has been notified of this information and all questions answered. Patient is going to not proceed at this time. She is going to see ENT for deviated septum, use breathright strips and lose weight.   She will call if she changes her mind

## 2024-01-11 ENCOUNTER — TELEPHONE (OUTPATIENT)
Dept: FAMILY MEDICINE CLINIC | Age: 44
End: 2024-01-11

## 2024-01-11 NOTE — TELEPHONE ENCOUNTER
Ingrid with [facility] called to get 12 months records for labs and imaging on the pt. Dr Wu CARDENAS states pt needs to call the office and initiate the request. If any questions give Ingrid a call [phone]. Fax # [phone]

## 2024-02-29 RX ORDER — LEVOTHYROXINE SODIUM 0.2 MG/1
TABLET ORAL
Qty: 90 TABLET | Refills: 0 | Status: SHIPPED | OUTPATIENT
Start: 2024-02-29

## 2024-03-04 ENCOUNTER — E-VISIT (OUTPATIENT)
Dept: FAMILY MEDICINE CLINIC | Age: 44
End: 2024-03-04

## 2024-03-04 DIAGNOSIS — B96.89 ACUTE BACTERIAL SINUSITIS: Primary | ICD-10-CM

## 2024-03-04 DIAGNOSIS — J01.90 ACUTE BACTERIAL SINUSITIS: Primary | ICD-10-CM

## 2024-03-04 PROCEDURE — 99422 OL DIG E/M SVC 11-20 MIN: CPT | Performed by: FAMILY MEDICINE

## 2024-03-04 RX ORDER — AZITHROMYCIN 250 MG/1
TABLET, FILM COATED ORAL
Qty: 6 TABLET | Refills: 0 | Status: SHIPPED | OUTPATIENT
Start: 2024-03-04 | End: 2024-03-14

## 2024-03-04 RX ORDER — METHYLPREDNISOLONE 4 MG/1
TABLET ORAL
Qty: 1 KIT | Refills: 0 | Status: SHIPPED | OUTPATIENT
Start: 2024-03-04 | End: 2024-03-10

## 2024-03-04 ASSESSMENT — LIFESTYLE VARIABLES: SMOKING_STATUS: NO, I'VE NEVER SMOKED

## 2024-03-04 NOTE — PROGRESS NOTES
Sayda Carrasquillo (1980) initiated an asynchronous digital communication through [employer].    HPI: per patient questionnaire     Exam: not applicable    Diagnoses and all orders for this visit:  Diagnoses and all orders for this visit:    Acute bacterial sinusitis  -     azithromycin (ZITHROMAX) 250 MG tablet; 500mg on day 1 followed by 250mg on days 2 - 5  -     methylPREDNISolone (MEDROL DOSEPACK) 4 MG tablet; Take by mouth.          Time: EV2 - 11-20 minutes were spent on the digital evaluation and management of this patient.     SALIMA MARCELINO, DO

## 2024-04-07 RX ORDER — BUPROPION HYDROCHLORIDE 200 MG/1
200 TABLET, EXTENDED RELEASE ORAL DAILY
Qty: 90 TABLET | Refills: 1 | Status: SHIPPED | OUTPATIENT
Start: 2024-04-07

## 2024-05-20 ENCOUNTER — OFFICE VISIT (OUTPATIENT)
Dept: BREAST CENTER | Age: 44
End: 2024-05-20
Payer: COMMERCIAL

## 2024-05-20 ENCOUNTER — HOSPITAL ENCOUNTER (OUTPATIENT)
Dept: WOMENS IMAGING | Age: 44
Discharge: HOME | End: 2024-05-20
Payer: COMMERCIAL

## 2024-05-20 VITALS
RESPIRATION RATE: 18 BRPM | OXYGEN SATURATION: 98 % | DIASTOLIC BLOOD PRESSURE: 78 MMHG | SYSTOLIC BLOOD PRESSURE: 120 MMHG | HEIGHT: 72 IN | HEART RATE: 71 BPM | BODY MASS INDEX: 35.59 KG/M2 | WEIGHT: 262.8 LBS

## 2024-05-20 DIAGNOSIS — Z12.31 SCREENING MAMMOGRAM FOR BREAST CANCER: ICD-10-CM

## 2024-05-20 DIAGNOSIS — Z17.0 MALIGNANT NEOPLASM OF UPPER-INNER QUADRANT OF RIGHT BREAST IN FEMALE, ESTROGEN RECEPTOR POSITIVE (HCC): Primary | ICD-10-CM

## 2024-05-20 DIAGNOSIS — Z90.11 S/P PARTIAL MASTECTOMY, RIGHT: ICD-10-CM

## 2024-05-20 DIAGNOSIS — C50.211 MALIGNANT NEOPLASM OF UPPER-INNER QUADRANT OF RIGHT BREAST IN FEMALE, ESTROGEN RECEPTOR POSITIVE (HCC): Primary | ICD-10-CM

## 2024-05-20 DIAGNOSIS — Z12.31 VISIT FOR SCREENING MAMMOGRAM: ICD-10-CM

## 2024-05-20 PROCEDURE — G0279 TOMOSYNTHESIS, MAMMO: HCPCS

## 2024-05-20 PROCEDURE — 99213 OFFICE O/P EST LOW 20 MIN: CPT | Performed by: SURGERY

## 2024-05-20 PROCEDURE — G2211 COMPLEX E/M VISIT ADD ON: HCPCS | Performed by: SURGERY

## 2024-05-20 NOTE — PROGRESS NOTES
5/20/2024    Chief Complaint   Patient presents with    6 Month Follow-Up     6m follow up-        [facility] Patient is s/p right lumpectomy/snbx 8/2023 for grade 2 T2N0 ductal carcinoma, ER/SD positive, Her2 negative, and also left breast excisional biopsy for an intraductal papilloma. Postop radiation, finished 10/2023. Taking tamoxifen, tolerating well. She has not felt any breast masses, no breast pain. She does have some right intramammary tenderness extending laterally.    Bilateral diagnostic mammogram today BIRADS 2B. Expected post treatment changes in the right breast.       Current Outpatient Medications:     buPROPion (WELLBUTRIN SR) 200 MG extended release tablet, Take 1 tablet by mouth daily, Disp: 90 tablet, Rfl: 1    sertraline (ZOLOFT) 50 MG tablet, Take 1 tablet by mouth daily, Disp: 90 tablet, Rfl: 1    levothyroxine (SYNTHROID) 200 MCG tablet, TAKE 1 TABLET BY MOUTH EVERY DAY, Disp: 90 tablet, Rfl: 0    meloxicam (MOBIC) 15 MG tablet, Take 1 tablet by mouth every morning, Disp: 30 tablet, Rfl: 3    Prenatal MV-Min-Fe Fum-FA-DHA (PRENATAL 1 PO), Take by mouth, Disp: , Rfl:       Past Medical History:   Diagnosis Date    ADHD (attention deficit hyperactivity disorder)     Anxiety     Cancer (HCC) 6/27/2023    Invasive ductile carcinoma and ductal carcinoma in situ    Depression     Hypothyroidism     Sleep apnea        Past Surgical History:   Procedure Laterality Date    BREAST BIOPSY Right 8/4/2023    RADIO FREQUENCY IDENTIFIED DEVICE LOCALIZED RIGHT BREAST LUMPECTOMY WITH RIGHT AXILLARY SENTINEL NODE BIOPSY/ performed by Margot Mendoza MD at [facility] OR    BREAST SURGERY Bilateral 8/4/2023    . performed by Margot Mendoza MD at [facility] OR    BREAST SURGERY Left 8/4/2023    RADIOFREQUENCY IDENTIFIED DEVICE LOCALIZED LEFT BREAST EXCISIONAL BIOPSY performed by Margot Mendoza MD at [facility] OR    US BREAST BIOPSY W LOC DEVICE 1ST LESION LEFT Left 7/24/2023    US BREAST BIOPSY W LOC DEVICE 1ST LESION

## 2024-05-20 NOTE — PATIENT INSTRUCTIONS
Mammogram reviewed, no concerning findings  Breast exam performed, no palpable masses.    Continue self breast exams    Healthy Lifestyle Recommendations: healthy diet (decrease consumption of red meat, increase fresh fruits and vegetables), decreased alcohol consumption (less than 4 drinks/week), adequate sleep (goal 6-8 hours), routine exercise (goal 150 minutes/week or greater), weight control.     Return: 1yr with bilateral screening mammogram

## 2024-05-30 RX ORDER — LEVOTHYROXINE SODIUM 0.2 MG/1
TABLET ORAL
Qty: 90 TABLET | Refills: 0 | Status: SHIPPED | OUTPATIENT
Start: 2024-05-30

## 2024-05-30 NOTE — TELEPHONE ENCOUNTER
Last office visit 3/4/2024     Last written     Next office visit scheduled Visit date not found    Requested Prescriptions     Pending Prescriptions Disp Refills    levothyroxine (SYNTHROID) 200 MCG tablet [Pharmacy Med Name: LEVOTHYROXINE 0.2MG (200MCG) TAB] 90 tablet 0     Sig: TAKE 1 TABLET BY MOUTH EVERY DAY

## 2024-07-14 ENCOUNTER — PATIENT MESSAGE (OUTPATIENT)
Dept: FAMILY MEDICINE CLINIC | Age: 44
End: 2024-07-14

## 2024-07-14 RX ORDER — HYDROCORTISONE 25 MG/G
CREAM TOPICAL
Qty: 28 G | Refills: 0 | Status: SHIPPED | OUTPATIENT
Start: 2024-07-14

## 2024-08-20 RX ORDER — HYDROCORTISONE 25 MG/G
CREAM TOPICAL
Qty: 30 G | Refills: 1 | Status: SHIPPED | OUTPATIENT
Start: 2024-08-20

## 2024-08-29 RX ORDER — LEVOTHYROXINE SODIUM 200 UG/1
TABLET ORAL
Qty: 90 TABLET | Refills: 0 | Status: SHIPPED | OUTPATIENT
Start: 2024-08-29

## 2024-09-23 RX ORDER — BUPROPION HYDROCHLORIDE 200 MG/1
200 TABLET, EXTENDED RELEASE ORAL DAILY
Qty: 90 TABLET | Refills: 0 | Status: SHIPPED | OUTPATIENT
Start: 2024-09-23

## 2024-11-18 RX ORDER — HYDROCORTISONE 25 MG/G
CREAM TOPICAL
Qty: 30 G | Refills: 0 | Status: SHIPPED | OUTPATIENT
Start: 2024-11-18

## 2024-11-29 RX ORDER — LEVOTHYROXINE SODIUM 200 UG/1
TABLET ORAL
Qty: 90 TABLET | Refills: 0 | Status: SHIPPED | OUTPATIENT
Start: 2024-11-29

## 2024-11-29 NOTE — TELEPHONE ENCOUNTER
Refill request received via fax from [facility].     Last office visit 3/4/2024      Next office visit scheduled Visit date not found    Requested Prescriptions      No prescriptions requested or ordered in this encounter

## 2024-12-29 RX ORDER — BUPROPION HYDROCHLORIDE 200 MG/1
200 TABLET, EXTENDED RELEASE ORAL DAILY
Qty: 90 TABLET | Refills: 0 | Status: SHIPPED | OUTPATIENT
Start: 2024-12-29

## 2025-02-03 ENCOUNTER — OFFICE VISIT (OUTPATIENT)
Dept: FAMILY MEDICINE CLINIC | Age: 45
End: 2025-02-03
Payer: COMMERCIAL

## 2025-02-03 VITALS
HEIGHT: 72 IN | BODY MASS INDEX: 35.57 KG/M2 | SYSTOLIC BLOOD PRESSURE: 122 MMHG | DIASTOLIC BLOOD PRESSURE: 78 MMHG | WEIGHT: 262.6 LBS

## 2025-02-03 DIAGNOSIS — Z00.00 WELL ADULT EXAM: Primary | ICD-10-CM

## 2025-02-03 DIAGNOSIS — C50.911 MALIGNANT NEOPLASM OF RIGHT BREAST IN FEMALE, ESTROGEN RECEPTOR POSITIVE, UNSPECIFIED SITE OF BREAST (HCC): ICD-10-CM

## 2025-02-03 DIAGNOSIS — Z17.0 MALIGNANT NEOPLASM OF RIGHT BREAST IN FEMALE, ESTROGEN RECEPTOR POSITIVE, UNSPECIFIED SITE OF BREAST (HCC): ICD-10-CM

## 2025-02-03 DIAGNOSIS — K64.4 EXTERNAL HEMORRHOID: ICD-10-CM

## 2025-02-03 PROCEDURE — 99396 PREV VISIT EST AGE 40-64: CPT | Performed by: FAMILY MEDICINE

## 2025-02-03 RX ORDER — MELOXICAM 15 MG/1
15 TABLET ORAL EVERY MORNING
Qty: 30 TABLET | Refills: 3 | Status: SHIPPED | OUTPATIENT
Start: 2025-02-03

## 2025-02-03 SDOH — ECONOMIC STABILITY: FOOD INSECURITY: WITHIN THE PAST 12 MONTHS, THE FOOD YOU BOUGHT JUST DIDN'T LAST AND YOU DIDN'T HAVE MONEY TO GET MORE.: NEVER TRUE

## 2025-02-03 SDOH — ECONOMIC STABILITY: FOOD INSECURITY: WITHIN THE PAST 12 MONTHS, YOU WORRIED THAT YOUR FOOD WOULD RUN OUT BEFORE YOU GOT MONEY TO BUY MORE.: NEVER TRUE

## 2025-02-03 ASSESSMENT — ENCOUNTER SYMPTOMS
GASTROINTESTINAL NEGATIVE: 1
RESPIRATORY NEGATIVE: 1

## 2025-02-03 ASSESSMENT — PATIENT HEALTH QUESTIONNAIRE - PHQ9
SUM OF ALL RESPONSES TO PHQ9 QUESTIONS 1 & 2: 0
SUM OF ALL RESPONSES TO PHQ QUESTIONS 1-9: 0
1. LITTLE INTEREST OR PLEASURE IN DOING THINGS: NOT AT ALL
2. FEELING DOWN, DEPRESSED OR HOPELESS: NOT AT ALL
SUM OF ALL RESPONSES TO PHQ QUESTIONS 1-9: 0

## 2025-02-03 NOTE — PROGRESS NOTES
OUTPATIENT PROGRESS NOTE  Date of Service:  2/3/2025  [place] [facility]  [facility]  [address]  [address]  [place] [address]  Dept: [phone]  Loc: [phone]    Subjective:      Patient ID:  [id number]  Sayda Carrasquillo is a 44 y.o. female     HPI  Well exam  She is doing well  Following with oncologist and Dr Mendoza   She does feel like she has a tender skin tag near her rectum  she has chronic constipation    Review of Systems   Constitutional: Negative.    Respiratory: Negative.     Cardiovascular: Negative.    Gastrointestinal: Negative.    Skin: Negative.    Neurological: Negative.        Objective:   YOB: 1980    Date of Visit:  2/3/2025     No Known Allergies    Outpatient Medications Marked as Taking for the 2/3/25 encounter (Office Visit) with Waldemar Washburn DO   Medication Sig Dispense Refill    sertraline (ZOLOFT) 50 MG tablet TAKE 1 TABLET BY MOUTH DAILY 90 tablet 0    buPROPion (WELLBUTRIN SR) 200 MG extended release tablet TAKE 1 TABLET BY MOUTH DAILY 90 tablet 0    levothyroxine (SYNTHROID) 200 MCG tablet TAKE 1 TABLET BY MOUTH EVERY DAY 90 tablet 0    hydrocortisone (ANUSOL-HC) 2.5 % CREA rectal cream APPLY TOPICALLY TO THE AFFECTED AREA TWICE DAILY AS NEEDED 30 g 0    meloxicam (MOBIC) 15 MG tablet Take 1 tablet by mouth every morning 30 tablet 3       Vitals:    02/03/25 1402   BP: 122/78   Weight: 119.1 kg (262 lb 9.6 oz)   Height: 1.829 m (6')     Body mass index is 35.61 kg/m².     Wt Readings from Last 3 Encounters:   02/03/25 119.1 kg (262 lb 9.6 oz)   05/20/24 119.2 kg (262 lb 12.8 oz)   10/26/23 119.3 kg (263 lb)     BP Readings from Last 3 Encounters:   02/03/25 122/78   05/20/24 120/78   11/08/23 119/68       Physical Exam  Vitals and nursing note reviewed.   Constitutional:       General: She is not in acute distress.     Appearance: She is well-developed.   HENT:      Head: Normocephalic.      Right Ear:

## 2025-02-09 RX ORDER — LEVOTHYROXINE SODIUM 200 UG/1
TABLET ORAL
Qty: 90 TABLET | Refills: 0 | Status: SHIPPED | OUTPATIENT
Start: 2025-02-09

## 2025-02-17 DIAGNOSIS — Z00.00 WELL ADULT EXAM: ICD-10-CM

## 2025-02-17 LAB
ALBUMIN SERPL-MCNC: 3.9 G/DL (ref 3.4–5)
ALBUMIN/GLOB SERPL: 1.6 {RATIO} (ref 1.1–2.2)
ALP SERPL-CCNC: 55 U/L (ref 40–129)
ALT SERPL-CCNC: 23 U/L (ref 10–40)
ANION GAP SERPL CALCULATED.3IONS-SCNC: 10 MMOL/L (ref 3–16)
AST SERPL-CCNC: 24 U/L (ref 15–37)
BILIRUB SERPL-MCNC: <0.2 MG/DL (ref 0–1)
BUN SERPL-MCNC: 13 MG/DL (ref 7–20)
CALCIUM SERPL-MCNC: 9 MG/DL (ref 8.3–10.6)
CHLORIDE SERPL-SCNC: 108 MMOL/L (ref 99–110)
CHOLEST SERPL-MCNC: 171 MG/DL (ref 0–199)
CO2 SERPL-SCNC: 22 MMOL/L (ref 21–32)
CREAT SERPL-MCNC: 0.9 MG/DL (ref 0.6–1.1)
DEPRECATED RDW RBC AUTO: 13.9 % (ref 12.4–15.4)
GFR SERPLBLD CREATININE-BSD FMLA CKD-EPI: 81 ML/MIN/{1.73_M2}
GLUCOSE SERPL-MCNC: 79 MG/DL (ref 70–99)
HCT VFR BLD AUTO: 38.7 % (ref 36–48)
HDLC SERPL-MCNC: 58 MG/DL (ref 40–60)
HGB BLD-MCNC: 12.7 G/DL (ref 12–16)
LDLC SERPL CALC-MCNC: 102 MG/DL
MCH RBC QN AUTO: 30.2 PG (ref 26–34)
MCHC RBC AUTO-ENTMCNC: 32.9 G/DL (ref 31–36)
MCV RBC AUTO: 91.6 FL (ref 80–100)
PLATELET # BLD AUTO: 240 K/UL (ref 135–450)
PMV BLD AUTO: 8.2 FL (ref 5–10.5)
POTASSIUM SERPL-SCNC: 4.3 MMOL/L (ref 3.5–5.1)
PROT SERPL-MCNC: 6.4 G/DL (ref 6.4–8.2)
RBC # BLD AUTO: 4.23 M/UL (ref 4–5.2)
SODIUM SERPL-SCNC: 140 MMOL/L (ref 136–145)
TRIGL SERPL-MCNC: 53 MG/DL (ref 0–150)
TSH SERPL DL<=0.005 MIU/L-ACNC: 2.93 UIU/ML (ref 0.27–4.2)
VLDLC SERPL CALC-MCNC: 11 MG/DL
WBC # BLD AUTO: 6.2 K/UL (ref 4–11)

## 2025-02-25 RX ORDER — LEVOTHYROXINE SODIUM 200 UG/1
TABLET ORAL
Qty: 90 TABLET | Refills: 0 | Status: SHIPPED | OUTPATIENT
Start: 2025-02-25

## 2025-03-26 ENCOUNTER — E-VISIT (OUTPATIENT)
Dept: FAMILY MEDICINE CLINIC | Age: 45
End: 2025-03-26
Payer: COMMERCIAL

## 2025-03-26 DIAGNOSIS — M62.838 SPASM OF CERVICAL PARASPINOUS MUSCLE: Primary | ICD-10-CM

## 2025-03-26 PROCEDURE — 99422 OL DIG E/M SVC 11-20 MIN: CPT | Performed by: FAMILY MEDICINE

## 2025-03-26 NOTE — PROGRESS NOTES
Sayda Carrasquillo (1980) initiated an asynchronous digital communication through [employer].    HPI: per patient questionnaire     Exam: not applicable    Diagnoses and all orders for this visit:  Diagnoses and all orders for this visit:    Spasm of cervical paraspinous muscle  -     tiZANidine (ZANAFLEX) 4 MG tablet; Take 1 tablet by mouth 3 times daily as needed (neck spasm)          12 minutes were spent on the digital evaluation and management of this patient.    SALIMA MARCELINO, DO

## 2025-03-27 ENCOUNTER — OFFICE VISIT (OUTPATIENT)
Dept: FAMILY MEDICINE CLINIC | Age: 45
End: 2025-03-27
Payer: COMMERCIAL

## 2025-03-27 VITALS
WEIGHT: 262.2 LBS | HEIGHT: 72 IN | BODY MASS INDEX: 35.51 KG/M2 | DIASTOLIC BLOOD PRESSURE: 78 MMHG | SYSTOLIC BLOOD PRESSURE: 122 MMHG

## 2025-03-27 DIAGNOSIS — M62.838 SPASM OF CERVICAL PARASPINOUS MUSCLE: Primary | ICD-10-CM

## 2025-03-27 PROCEDURE — 99213 OFFICE O/P EST LOW 20 MIN: CPT | Performed by: FAMILY MEDICINE

## 2025-03-27 RX ORDER — METHYLPREDNISOLONE 4 MG/1
TABLET ORAL
Qty: 1 KIT | Refills: 0 | Status: SHIPPED | OUTPATIENT
Start: 2025-03-27 | End: 2025-04-02

## 2025-03-27 RX ORDER — METHOCARBAMOL 750 MG/1
750 TABLET, FILM COATED ORAL 3 TIMES DAILY PRN
COMMUNITY
Start: 2025-03-26

## 2025-03-27 RX ORDER — HYDROCODONE BITARTRATE AND ACETAMINOPHEN 5; 325 MG/1; MG/1
1 TABLET ORAL EVERY 4 HOURS PRN
COMMUNITY
Start: 2025-03-26

## 2025-03-27 NOTE — PROGRESS NOTES
OUTPATIENT PROGRESS NOTE  Date of Service:  3/27/2025  Address: [facility]  [facility]  [address]  [address]  [place] [place] [facility] [place] [address]  Dept: [phone]  Loc: [phone]    Subjective:      Patient ID:  [id number]  Sayda Carrasquillo is a 44 y.o. female     Neck Pain   This is a new problem. The current episode started in the past 7 days. The problem occurs constantly. The pain is associated with nothing. The pain is present in the midline. The quality of the pain is described as aching and burning. The pain is moderate. The symptoms are aggravated by bending, position and twisting. Associated symptoms include headaches. Treatments tried: started muscle relaxant  went to urgent care and they changed muscle relaxant and gave tramadol.         Review of Systems   Musculoskeletal:  Positive for neck pain.   Neurological:  Positive for headaches.       Objective:   YOB: 1980    Date of Visit:  3/27/2025     No Known Allergies    Outpatient Medications Marked as Taking for the 3/27/25 encounter (Office Visit) with Waldemar Washburn,    Medication Sig Dispense Refill    methocarbamol (ROBAXIN) 750 MG tablet Take 1 tablet by mouth Three times daily as needed      HYDROcodone-acetaminophen (NORCO) 5-325 MG per tablet Take 1 tablet by mouth every 4 hours as needed.      methylPREDNISolone (MEDROL DOSEPACK) 4 MG tablet Take by mouth. 1 kit 0    tiZANidine (ZANAFLEX) 4 MG tablet Take 1 tablet by mouth 3 times daily as needed (neck spasm) 30 tablet 0    levothyroxine (SYNTHROID) 200 MCG tablet TAKE 1 TABLET BY MOUTH EVERY DAY 90 tablet 0    meloxicam (MOBIC) 15 MG tablet Take 1 tablet by mouth every morning 30 tablet 3    sertraline (ZOLOFT) 50 MG tablet TAKE 1 TABLET BY MOUTH DAILY 90 tablet 0    buPROPion (WELLBUTRIN SR) 200 MG extended release tablet TAKE 1 TABLET BY MOUTH DAILY 90 tablet 0    hydrocortisone (ANUSOL-HC) 2.5 % CREA rectal cream APPLY TOPICALLY

## 2025-04-02 RX ORDER — BUPROPION HYDROCHLORIDE 200 MG/1
200 TABLET, EXTENDED RELEASE ORAL DAILY
Qty: 90 TABLET | Refills: 0 | Status: SHIPPED | OUTPATIENT
Start: 2025-04-02

## 2025-04-02 NOTE — TELEPHONE ENCOUNTER
Last office visit 3/27/2025      Next office visit scheduled Visit date not found    Requested Prescriptions     Pending Prescriptions Disp Refills    buPROPion (WELLBUTRIN SR) 200 MG extended release tablet [Pharmacy Med Name: BUPROPION SR 200MG TABLETS (12HR)] 90 tablet 0     Sig: TAKE 1 TABLET BY MOUTH DAILY

## 2025-06-02 ENCOUNTER — HOSPITAL ENCOUNTER (OUTPATIENT)
Dept: WOMENS IMAGING | Age: 45
Discharge: HOME | End: 2025-06-02
Payer: COMMERCIAL

## 2025-06-02 ENCOUNTER — CLINICAL SUPPORT (OUTPATIENT)
Dept: FAMILY MEDICINE CLINIC | Age: 45
End: 2025-06-02
Payer: COMMERCIAL

## 2025-06-02 VITALS — HEIGHT: 72 IN | WEIGHT: 257 LBS | BODY MASS INDEX: 34.81 KG/M2

## 2025-06-02 DIAGNOSIS — Z12.31 VISIT FOR SCREENING MAMMOGRAM: ICD-10-CM

## 2025-06-02 PROCEDURE — 90471 IMMUNIZATION ADMIN: CPT | Performed by: FAMILY MEDICINE

## 2025-06-02 PROCEDURE — 90746 HEPB VACCINE 3 DOSE ADULT IM: CPT | Performed by: FAMILY MEDICINE

## 2025-06-02 PROCEDURE — 77063 BREAST TOMOSYNTHESIS BI: CPT

## 2025-06-02 NOTE — PROGRESS NOTES
Pt in for Hepatitis B: injection #  1 administered today- risks and benefits discussed.    Administered in Right deltoid Intramuscular.    tolerated the procedure well with no complications.

## 2025-06-04 ENCOUNTER — TRANSCRIBE ORDERS (OUTPATIENT)
Dept: ADMINISTRATIVE | Age: 45
End: 2025-06-04

## 2025-06-04 DIAGNOSIS — C50.211 MALIGNANT NEOPLASM OF UPPER-INNER QUADRANT OF RIGHT FEMALE BREAST, UNSPECIFIED ESTROGEN RECEPTOR STATUS (HCC): Primary | ICD-10-CM

## 2025-07-02 ENCOUNTER — CLINICAL SUPPORT (OUTPATIENT)
Dept: FAMILY MEDICINE CLINIC | Age: 45
End: 2025-07-02
Payer: COMMERCIAL

## 2025-07-02 DIAGNOSIS — Z23 NEED FOR HEPATITIS B VACCINATION: Primary | ICD-10-CM

## 2025-07-02 PROCEDURE — 90746 HEPB VACCINE 3 DOSE ADULT IM: CPT | Performed by: FAMILY MEDICINE

## 2025-07-02 PROCEDURE — 90471 IMMUNIZATION ADMIN: CPT | Performed by: FAMILY MEDICINE

## 2025-07-02 NOTE — PROGRESS NOTES
Pt came into office for 2nd of 3 Hep B vaccines.    Administered one PFS of Engerix B into left deltoid without complications    Pt advised to schedule appointment for 3rd and final vaccine in 5 months.

## 2025-07-04 RX ORDER — BUPROPION HYDROCHLORIDE 200 MG/1
200 TABLET, EXTENDED RELEASE ORAL DAILY
Qty: 90 TABLET | Refills: 0 | Status: SHIPPED | OUTPATIENT
Start: 2025-07-04

## 2025-08-28 RX ORDER — LEVOTHYROXINE SODIUM 200 UG/1
200 TABLET ORAL DAILY
Qty: 90 TABLET | Refills: 0 | Status: SHIPPED | OUTPATIENT
Start: 2025-08-28

## (undated) DEVICE — INTENDED USE FOR SURGICAL MARKING ON INTACT SKIN, ALSO PROVIDES A PERMANENT METHOD OF IDENTIFYING OBJECTS IN THE OPERATING ROOM: Brand: WRITESITE® PLUS MINI PREP RESISTANT MARKER

## (undated) DEVICE — GLOVE SURG SZ 7 L12IN FNGR THK79MIL GRN LTX FREE

## (undated) DEVICE — PROBE SET W/ DRP

## (undated) DEVICE — GLOVE ORANGE PI 7   MSG9070

## (undated) DEVICE — GOWN,SIRUS,POLYRNF,BRTHSLV,XLN/XL,20/CS: Brand: MEDLINE

## (undated) DEVICE — MEDICINE CUP, GRADUATED, STER: Brand: MEDLINE

## (undated) DEVICE — APPLICATOR MEDICATED 26 CC SOLUTION HI LT ORNG CHLORAPREP

## (undated) DEVICE — SHEET,DRAPE,40X58,STERILE: Brand: MEDLINE

## (undated) DEVICE — TOWEL,STOP FLAG GOLD N-W: Brand: MEDLINE

## (undated) DEVICE — MINOR BREAST: Brand: MEDLINE INDUSTRIES, INC.

## (undated) DEVICE — CONTAINER,SPECIMEN,PNEU TUBE,3OZ,OR STRL: Brand: MEDLINE

## (undated) DEVICE — PENCIL SMK EVAC TELSCP 3 M TBNG

## (undated) DEVICE — BOWL MED L 32OZ PLAS W/ MOLD GRAD EZ OPN PEEL PCH

## (undated) DEVICE — SUTURE VCRL + SZ 3-0 L27IN ABSRB UD L26MM SH 1/2 CIR VCP416H

## (undated) DEVICE — LIQUIBAND RAPID ADHESIVE 36/CS 0.8ML: Brand: MEDLINE

## (undated) DEVICE — PROVE COVER: Brand: UNBRANDED

## (undated) DEVICE — CLEANER,CAUTERY TIP,2X2",STERILE: Brand: MEDLINE

## (undated) DEVICE — PENCIL ELECSURG HND CTRL ALL IN 1 MONOPOLAR LAP

## (undated) DEVICE — GAUZE FLUFF 2 PLY: Brand: DEROYAL

## (undated) DEVICE — SUTURE MCRYL + SZ 4-0 L27IN ABSRB UD L19MM PS-2 3/8 CIR MCP426H

## (undated) DEVICE — DRAPE,UTILTY,TAPE,15X26, 4EA/PK: Brand: MEDLINE

## (undated) DEVICE — YANKAUER,OPEN TIP,W/O VENT,STERILE: Brand: MEDLINE INDUSTRIES, INC.

## (undated) DEVICE — DRAPE,T,LAPARO,TRANS,STERILE: Brand: MEDLINE

## (undated) DEVICE — SUTURE VCRL + SZ 3-0 L18IN ABSRB UD SH 1/2 CIR TAPERCUT NDL VCP864D